# Patient Record
Sex: FEMALE | Race: WHITE | NOT HISPANIC OR LATINO | Employment: PART TIME | URBAN - METROPOLITAN AREA
[De-identification: names, ages, dates, MRNs, and addresses within clinical notes are randomized per-mention and may not be internally consistent; named-entity substitution may affect disease eponyms.]

---

## 2019-12-16 ENCOUNTER — OFFICE VISIT (OUTPATIENT)
Dept: FAMILY MEDICINE | Facility: CLINIC | Age: 24
End: 2019-12-16
Payer: COMMERCIAL

## 2019-12-16 ENCOUNTER — TELEPHONE (OUTPATIENT)
Dept: FAMILY MEDICINE | Facility: CLINIC | Age: 24
End: 2019-12-16

## 2019-12-16 VITALS
TEMPERATURE: 98.4 F | HEIGHT: 69 IN | WEIGHT: 172 LBS | HEART RATE: 63 BPM | SYSTOLIC BLOOD PRESSURE: 105 MMHG | DIASTOLIC BLOOD PRESSURE: 66 MMHG | BODY MASS INDEX: 25.48 KG/M2

## 2019-12-16 DIAGNOSIS — Z11.4 SCREENING FOR HIV (HUMAN IMMUNODEFICIENCY VIRUS): ICD-10-CM

## 2019-12-16 DIAGNOSIS — J45.20 MILD INTERMITTENT ASTHMA, UNSPECIFIED WHETHER COMPLICATED: ICD-10-CM

## 2019-12-16 DIAGNOSIS — L30.9 ECZEMA, UNSPECIFIED TYPE: Primary | ICD-10-CM

## 2019-12-16 DIAGNOSIS — L67.8 ABNORMAL FACIAL HAIR: ICD-10-CM

## 2019-12-16 DIAGNOSIS — L71.0 PERIORAL DERMATITIS: ICD-10-CM

## 2019-12-16 DIAGNOSIS — L21.9 SEBORRHEIC DERMATITIS: ICD-10-CM

## 2019-12-16 LAB — TSH SERPL DL<=0.005 MIU/L-ACNC: 1.47 MU/L (ref 0.4–4)

## 2019-12-16 PROCEDURE — 36415 COLL VENOUS BLD VENIPUNCTURE: CPT | Performed by: PHYSICIAN ASSISTANT

## 2019-12-16 PROCEDURE — 84443 ASSAY THYROID STIM HORMONE: CPT | Performed by: PHYSICIAN ASSISTANT

## 2019-12-16 PROCEDURE — 99203 OFFICE O/P NEW LOW 30 MIN: CPT | Performed by: PHYSICIAN ASSISTANT

## 2019-12-16 PROCEDURE — 84270 ASSAY OF SEX HORMONE GLOBUL: CPT | Performed by: PHYSICIAN ASSISTANT

## 2019-12-16 PROCEDURE — 84403 ASSAY OF TOTAL TESTOSTERONE: CPT | Performed by: PHYSICIAN ASSISTANT

## 2019-12-16 PROCEDURE — 87389 HIV-1 AG W/HIV-1&-2 AB AG IA: CPT | Performed by: PHYSICIAN ASSISTANT

## 2019-12-16 RX ORDER — BETAMETHASONE DIPROPIONATE 0.5 MG/G
CREAM TOPICAL 2 TIMES DAILY
COMMUNITY
End: 2020-05-06

## 2019-12-16 RX ORDER — ALBUTEROL SULFATE 90 UG/1
2 AEROSOL, METERED RESPIRATORY (INHALATION) EVERY 6 HOURS
COMMUNITY
End: 2021-12-10

## 2019-12-16 RX ORDER — TRIAMCINOLONE ACETONIDE 1 MG/G
CREAM TOPICAL 2 TIMES DAILY
COMMUNITY
End: 2020-05-06

## 2019-12-16 RX ORDER — KETOCONAZOLE 20 MG/ML
SHAMPOO TOPICAL DAILY PRN
Qty: 120 ML | Refills: 1 | Status: SHIPPED | OUTPATIENT
Start: 2019-12-16 | End: 2020-05-06

## 2019-12-16 RX ORDER — PIMECROLIMUS 10 MG/G
CREAM TOPICAL 2 TIMES DAILY
Qty: 100 G | Refills: 1 | Status: SHIPPED | OUTPATIENT
Start: 2019-12-16 | End: 2020-05-06

## 2019-12-16 RX ORDER — ALBUTEROL SULFATE 1.25 MG/3ML
1.25 SOLUTION RESPIRATORY (INHALATION) EVERY 6 HOURS PRN
COMMUNITY
End: 2021-12-10

## 2019-12-16 RX ORDER — KETOCONAZOLE 20 MG/ML
SHAMPOO TOPICAL DAILY PRN
COMMUNITY
End: 2019-12-16

## 2019-12-16 ASSESSMENT — MIFFLIN-ST. JEOR: SCORE: 1604.57

## 2019-12-16 NOTE — LETTER
[facility]   [address] [place]  [address]  [phone]                                   December 18, 2019    Rand Alvarado  [id number]   [place] [address]        Dear Rand,    labs are normal.  No cause seen for her abnormal hair growth.    Results for orders placed or performed in visit on 12/16/19   HIV Screening     Status: None   Result Value Ref Range    HIV Antigen Antibody Combo Nonreactive NR^Nonreactive       TSH with free T4 reflex     Status: None   Result Value Ref Range    TSH 1.47 0.40 - 4.00 mU/L   **Testosterone Free and Total FUTURE anytime     Status: None   Result Value Ref Range    Testosterone Total 17 8 - 60 ng/dL    Sex Hormone Binding Globulin 39 30 - 135 nmol/L    Free Testosterone Calculated 0.27 0.08 - 0.74 ng/dL       If you have any questions please call the clinic at [phone]    Sincerely,    Caridad Gray PA-C  [facility]

## 2019-12-16 NOTE — TELEPHONE ENCOUNTER
Panel Management Review      Patient has the following on her problem list:     Asthma review     ACT Total Scores 12/16/2019   ACT TOTAL SCORE (Goal Greater than or Equal to 20) 25   In the past 12 months, how many times did you visit the emergency room for your asthma without being admitted to the hospital? 0   In the past 12 months, how many times were you hospitalized overnight because of your asthma? 0      1. Is Asthma diagnosis on the Problem List? Yes    2. Is Asthma listed on Health Maintenance? Yes    3. Patient is due for:  ACT and AAP      Composite cancer screening  Chart review shows that this patient is due/due soon for the following None  Summary:    Patient is due/failing the following:   ACT    Action needed:   Patient needs to do ACT.    Type of outreach:    act done     Questions for provider review:    None                                                                                                                                         Chart routed to Care Team .

## 2019-12-16 NOTE — PROGRESS NOTES
"Subjective     Rand Alvarado is a 24 year old female who presents to clinic today for the following health issues:    HPI   New Patient/Transfer of Care  Medication Followup of  Eczema meds and refill     Taking Medication as prescribed: yes    Side Effects:  None    Medication Helping Symptoms:  yes     Eczema flares in the winter.  Worse this year.  Has on arms, neck and face and back of knees.  Uses shampoo on face.  worse on eyelids and around lips.  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    IUD-poss side effects discussion -weight gain,facial hair.IUD was placed 7-1-2019.  Had one before with no concerns.  Her grandmother has hx of thyroid problems but she has been checked before.  Last few months started getting facial hair and chest and neck and gained weight and now can't loose it.  More constipation.  More emotional.      Last pap done at [facility] last year Dec 2018-normal           Patient Active Problem List   Diagnosis     Mild intermittent asthma, unspecified whether complicated     Seborrheic dermatitis     Eczema, unspecified type     Past Surgical History:   Procedure Laterality Date     HC TOOTH EXTRACTION W/FORCEP  2015     KNEE SURGERY Bilateral 2010     KNEE SURGERY Bilateral 2012       Social History     Tobacco Use     Smoking status: Never Smoker     Smokeless tobacco: Never Used   Substance Use Topics     Alcohol use: Yes     Comment: socially      Family History   Problem Relation Age of Onset     Lupus Sister              Reviewed and updated as needed this visit by Provider  Tobacco  Allergies  Meds  Problems  Med Hx  Surg Hx  Fam Hx         Review of Systems   As above      Objective    /66   Pulse 63   Temp 98.4  F (36.9  C) (Oral)   Ht 1.753 m (5' 9\")   Wt 78 kg (172 lb)   BMI 25.40 kg/m    Body mass index is 25.4 kg/m .  Physical Exam  Constitutional:       General: She is not in acute " "distress.  Cardiovascular:      Rate and Rhythm: Normal rate and regular rhythm.   Pulmonary:      Effort: Pulmonary effort is normal.      Breath sounds: Normal breath sounds.   Skin:     Comments: Red thick skin on eye lids and cracked skin in corners of mouth, red irritated appearing skin in antecubital area, red, cracked skin on hands   Neurological:      Mental Status: She is alert.            Diagnostic Test Results:  none         Assessment & Plan     1. Eczema, unspecified type  If not improving with using new cream and using lotion with gloves at night see derm  - DERMATOLOGY REFERRAL    2. Seborrheic dermatitis  As above  - ketoconazole (NIZORAL) 2 % external shampoo; Apply topically daily as needed for itching or irritation Apply topically daily as needed for itching or irritation  Dispense: 120 mL; Refill: 1  - DERMATOLOGY REFERRAL    3. Mild intermittent asthma, unspecified whether complicated  Stable.  No changes    4. Abnormal facial hair  Follow up when labs are back  - TSH with free T4 reflex  - **Testosterone Free and Total FUTURE anytime    5. Screening for HIV (human immunodeficiency virus)    - HIV Screening    6. Perioral dermatitis  As above  - pimecrolimus (ELIDEL) 1 % external cream; Apply topically 2 times daily Apply thin layer to eye lids and around lips and on hands  Dispense: 100 g; Refill: 1  - DERMATOLOGY REFERRAL     BMI:   Estimated body mass index is 25.4 kg/m  as calculated from the following:    Height as of this encounter: 1.753 m (5' 9\").    Weight as of this encounter: 78 kg (172 lb).               Return in about 3 months (around 3/16/2020) for if not improving.    Caridad Gray PA-C  [facility]      "

## 2019-12-17 LAB — HIV 1+2 AB+HIV1 P24 AG SERPL QL IA: NONREACTIVE

## 2019-12-17 ASSESSMENT — ASTHMA QUESTIONNAIRES: ACT_TOTALSCORE: 25

## 2019-12-18 LAB
SHBG SERPL-SCNC: 39 NMOL/L (ref 30–135)
TESTOST FREE SERPL-MCNC: 0.27 NG/DL (ref 0.08–0.74)
TESTOST SERPL-MCNC: 17 NG/DL (ref 8–60)

## 2020-02-18 ENCOUNTER — TELEPHONE (OUTPATIENT)
Dept: FAMILY MEDICINE | Facility: CLINIC | Age: 25
End: 2020-02-18

## 2020-02-18 DIAGNOSIS — L30.9 ECZEMA, UNSPECIFIED TYPE: Primary | ICD-10-CM

## 2020-02-18 NOTE — TELEPHONE ENCOUNTER
Referral is in but I would suggest she check with her insurance first to see if they will cover the office visit    Caridad Gray PA-C

## 2020-02-18 NOTE — TELEPHONE ENCOUNTER
Reason for Call:  Other referral for nutritionist     Detailed comments: patient would like to see a nutritionist for her eczema   Please call when this has been done so she can make this appointment     Phone Number Patient can be reached at: Home number on file [phone] (home)    Best Time: any    Can we leave a detailed message on this number? YES    Call taken on 2/18/2020 at 4:16 PM by Molly Gill

## 2020-02-19 NOTE — TELEPHONE ENCOUNTER
Left message with information regarding referral  [facility]  Team 3 Coordinator

## 2020-05-01 ENCOUNTER — VIRTUAL VISIT (OUTPATIENT)
Dept: ONCOLOGY | Facility: CLINIC | Age: 25
End: 2020-05-01
Attending: PHYSICIAN ASSISTANT
Payer: COMMERCIAL

## 2020-05-01 DIAGNOSIS — L30.9 ECZEMA, UNSPECIFIED TYPE: Primary | ICD-10-CM

## 2020-05-01 PROCEDURE — 97802 MEDICAL NUTRITION INDIV IN: CPT | Mod: TEL,ZF | Performed by: DIETITIAN, REGISTERED

## 2020-05-01 NOTE — PROGRESS NOTES
"Rand Alvarado is a 25 year old female who is being evaluated via a billable telephone visit.      The patient has been notified of following:     \"This telephone visit will be conducted via a call between you and your physician/provider. We have found that certain health care needs can be provided without the need for a physical exam.  This service lets us provide the care you need with a short phone conversation.  If a prescription is necessary we can send it directly to your pharmacy.  If lab work is needed we can place an order for that and you can then stop by our lab to have the test done at a later time.    Telephone visits are billed at different rates depending on your insurance coverage. During this emergency period, for some insurers they may be billed the same as an in-person visit.  Please reach out to your insurance provider with any questions.    If during the course of the call the physician/provider feels a telephone visit is not appropriate, you will not be charged for this service.\"    Patient has given verbal consent for Telephone visit?  Yes    CLINICAL NUTRITION SERVICES - ASSESSMENT NOTE    Rand Alvarado 25 year old referred for MNT related to eczema    Time Spent: 45 minutes  Visit Type: phone  Referring Physician: NADEGE Catalan 2/18    NUTRITION HISTORY  Current diet: gluten free/dairy free      Rand tells me that since her visit with her PCP and dermatologist, she has tried the Whole30 diet.  With this, she noticed very good improvement in her skin.    She tells me that this diet was challenging for her but she really liked the results, thus, would like to follow a similar, more flexible version.    She inquires about what foods to avoid and consume to further help improve her excema.        ANTHROPOMETRICS  Height: 69\"  Weight: 172 lbs/78kg  BMI: 25  Weight Status:  Overweight BMI 25-29.9  IBW: 145 lbs  % IBW: 118%  Weight History: no changes per pt report  Wt " Readings from Last 6 Encounters:   12/16/19 78 kg (172 lb)     Medications/vitamins/minerals/herbals:   Reviewed    Labs:  Labs reviewed    Physical Findings:  NA    ASSESSED NUTRITION NEEDS:  Estimated Energy Needs: 2000 kcals (25 Kcal/Kg)  Justification: maintenance  Estimated Protein Needs: 80 grams protein (1 g pro/Kg)  Justification: maintenance  Estimated Fluid Needs: 2000  mL   Justification: maintenance    NUTRITION DIAGNOSIS:  Food and nutrition-related knowledge deficit related to allergens/excema as evidenced by pt with questions regarding foods to avoid and foods to consume with excema      INTERVENTIONS  Provided written & verbal education:   Reviewed foods to limit/avoid - most dairy, gluten, soy, tree nuts and processed foods.  In addition, studies show it might be helpful to avoid acidic and spicy foods.     Reviewed food to consume - non-starch vegetables, fruits, fish (omega 3), whole grains, probiotics (including yogurt/kefir), adequate hydration, vitamin D.       Pt verbalize understanding of materials provided during consult.   Patient Understanding: Excellent  Expected Compliance: Excellent     Goals  1.  Gluten free/dairy free diet (yogurt is okay)  2.  Limit processed foods     Follow-Up Plans: Pt has RD contact information for questions.      Viridiana Merritt RDN, LDN

## 2020-05-06 ENCOUNTER — VIRTUAL VISIT (OUTPATIENT)
Dept: FAMILY MEDICINE | Facility: CLINIC | Age: 25
End: 2020-05-06
Payer: COMMERCIAL

## 2020-05-06 DIAGNOSIS — J02.9 ACUTE PHARYNGITIS, UNSPECIFIED ETIOLOGY: Primary | ICD-10-CM

## 2020-05-06 DIAGNOSIS — L21.9 SEBORRHEIC DERMATITIS: ICD-10-CM

## 2020-05-06 PROCEDURE — 99213 OFFICE O/P EST LOW 20 MIN: CPT | Mod: 95 | Performed by: FAMILY MEDICINE

## 2020-05-06 RX ORDER — AMOXICILLIN 500 MG/1
500 CAPSULE ORAL 3 TIMES DAILY
Qty: 30 CAPSULE | Refills: 0 | Status: SHIPPED | OUTPATIENT
Start: 2020-05-06 | End: 2020-11-16

## 2020-05-06 RX ORDER — KETOCONAZOLE 20 MG/ML
SHAMPOO TOPICAL DAILY PRN
Qty: 240 ML | Refills: 3 | Status: SHIPPED | OUTPATIENT
Start: 2020-05-06 | End: 2021-12-22

## 2020-05-06 NOTE — LETTER
[address] [id number] [phone]              May 6, 2020    To whom it may concern     Rand Alvarado  89 was evaluated today.  She needs to be off work through this Friday May 8.  Plan return to work Saturday May 9.              Sincerely,                      Justus Dialy MD

## 2020-05-06 NOTE — PROGRESS NOTES
"=Rand Alvarado is a 25 year old female who is being evaluated via a billable telephone visit.      The patient has been notified of following:     \"This telephone visit will be conducted via a call between you and your physician/provider. We have found that certain health care needs can be provided without the need for a physical exam.  This service lets us provide the care you need with a short phone conversation.  If a prescription is necessary we can send it directly to your pharmacy.  If lab work is needed we can place an order for that and you can then stop by our lab to have the test done at a later time.    Telephone visits are billed at different rates depending on your insurance coverage. During this emergency period, for some insurers they may be billed the same as an in-person visit.  Please reach out to your insurance provider with any questions.    If during the course of the call the physician/provider feels a telephone visit is not appropriate, you will not be charged for this service.\"    Patient has given verbal consent for Telephone visit?  Yes    What phone number would you like to be contacted at? [phone]    How would you like to obtain your AVS? Mail a copy    Subjective     Rand Alvarado is a 25 year old female who presents to clinic today for the following health issues:    HPI  Sore throat and lymph nodes in the neck are really tender, headache for the past 1.5 weeks       Getting worse     Hurts to swallow    Around sick people    A few weeks ago strep exposure    Patient prone to strep    No fever    No shortness of breath    No coughing much         Reviewed and updated as needed this visit by Provider         Review of Systems   Asthma only acts up with exercise  Or bad resp symptoms            Objective   Reported vitals:  There were no vitals taken for this visit.   healthy, alert and no distress  PSYCH: Alert and oriented times 3; coherent speech, normal   rate " and volume, able to articulate logical thoughts, able   to abstract reason, no tangential thoughts, no hallucinations   or delusions  Her affect is normal  RESP: No cough, no audible wheezing, able to talk in full sentences  Remainder of exam unable to be completed due to telephone visits    Diagnostic Test Results:  Labs reviewed in Epic        Assessment/Plan:  1. Seborrheic dermatitis  Patient needs refill and would like a bigger quantity; sent in refills for her  - ketoconazole (NIZORAL) 2 % external shampoo; Apply topically daily as needed for itching or irritation Apply topically daily as needed for itching or irritation  Dispense: 240 mL; Refill: 3    2. Acute pharyngitis, unspecified etiology  Given duration and worsening nature of symptoms, will treat.  Follow up prn symptoms.   - amoxicillin (AMOXIL) 500 MG capsule; Take 1 capsule (500 mg) by mouth 3 times daily  Dispense: 30 capsule; Refill: 0    No follow-ups on file.    Also did letter for patient.  She will  at .    Phone call duration:12 minutes ( ( 3:35 to 3:47 pm )      Justus Daily MD

## 2020-11-16 ENCOUNTER — OFFICE VISIT (OUTPATIENT)
Dept: FAMILY MEDICINE | Facility: CLINIC | Age: 25
End: 2020-11-16
Payer: COMMERCIAL

## 2020-11-16 VITALS
BODY MASS INDEX: 23.19 KG/M2 | TEMPERATURE: 97.4 F | OXYGEN SATURATION: 98 % | WEIGHT: 153 LBS | SYSTOLIC BLOOD PRESSURE: 112 MMHG | HEART RATE: 67 BPM | DIASTOLIC BLOOD PRESSURE: 71 MMHG | HEIGHT: 68 IN

## 2020-11-16 DIAGNOSIS — S06.0X0A CONCUSSION WITHOUT LOSS OF CONSCIOUSNESS, INITIAL ENCOUNTER: Primary | ICD-10-CM

## 2020-11-16 DIAGNOSIS — R11.0 NAUSEA: ICD-10-CM

## 2020-11-16 PROCEDURE — 99214 OFFICE O/P EST MOD 30 MIN: CPT | Performed by: FAMILY MEDICINE

## 2020-11-16 RX ORDER — CLOBETASOL PROPIONATE 0.5 MG/G
OINTMENT TOPICAL 2 TIMES DAILY
COMMUNITY
End: 2021-12-10

## 2020-11-16 RX ORDER — ONDANSETRON 4 MG/1
4 TABLET, FILM COATED ORAL EVERY 8 HOURS PRN
Qty: 20 TABLET | Refills: 0 | Status: SHIPPED | OUTPATIENT
Start: 2020-11-16 | End: 2021-12-10

## 2020-11-16 RX ORDER — TRIAMCINOLONE ACETONIDE 1 MG/G
OINTMENT TOPICAL
COMMUNITY
Start: 2020-07-30 | End: 2021-12-10

## 2020-11-16 ASSESSMENT — MIFFLIN-ST. JEOR: SCORE: 1499.25

## 2020-11-16 ASSESSMENT — ASTHMA QUESTIONNAIRES
ACT_TOTALSCORE: 25
QUESTION_4 LAST FOUR WEEKS HOW OFTEN HAVE YOU USED YOUR RESCUE INHALER OR NEBULIZER MEDICATION (SUCH AS ALBUTEROL): NOT AT ALL
QUESTION_5 LAST FOUR WEEKS HOW WOULD YOU RATE YOUR ASTHMA CONTROL: COMPLETELY CONTROLLED
QUESTION_1 LAST FOUR WEEKS HOW MUCH OF THE TIME DID YOUR ASTHMA KEEP YOU FROM GETTING AS MUCH DONE AT WORK, SCHOOL OR AT HOME: NONE OF THE TIME
QUESTION_3 LAST FOUR WEEKS HOW OFTEN DID YOUR ASTHMA SYMPTOMS (WHEEZING, COUGHING, SHORTNESS OF BREATH, CHEST TIGHTNESS OR PAIN) WAKE YOU UP AT NIGHT OR EARLIER THAN USUAL IN THE MORNING: NOT AT ALL
QUESTION_2 LAST FOUR WEEKS HOW OFTEN HAVE YOU HAD SHORTNESS OF BREATH: NOT AT ALL

## 2020-11-16 ASSESSMENT — PAIN SCALES - GENERAL: PAINLEVEL: MODERATE PAIN (4)

## 2020-11-16 NOTE — PATIENT INSTRUCTIONS
Patient Education     After a Concussion     Awaken to check alertness as often as the health care provider suggests.     If you had a mild concussion (a head injury), watch closely for signs of problems during the first 48 hours after the injury. Follow the doctor s advice about recovering at home. Use the tips on this handout as a guide.  Note: You should not be left alone after a concussion. If no adult can stay with the injured person, let the doctor know.  Have someone call 911 or your emergency number if you can't fully wake up or have a seizures or convulsions.  The first 48 hours  Don t take medicine unless approved by your healthcare provider. Try placing a cold, damp cloth on your head to help relieve a headache.    Ask the doctor before using any medicines.    Don't drink alcohol or take sedatives or medicines that make you sleepy.    Don't return to sports or any activity that could cause you to hit your head until all symptoms are gone and you have been cleared by your doctor. A second head injury before fully recovering from the first one can lead to serious brain injury.    Don't do activities that need a lot of concentration or a lot of attention. This will allow your brain to rest and heal more quickly.    Return to regular physical and mental activity as directed and approved by your healthcare provider.  Tips about sleeping  For the first day or two, it may be best not to sleep for long periods of time without being checked for alertness. Follow the doctor s instructions.  ? Have someone wake you every ____ hours for the next ____ hours. He or she should ask you questions to check for alertness.  ? OK to sleep through the night.  When to call the healthcare provider  If you notice any of the following, call the healthcare provider:    Vomiting. Some vomiting is common, but tell the provider about any vomiting.    Clear or bloody drainage from the nose or ear    Constant drowsiness or difficulty  in waking up    Confusion or memory loss    Blurred vision or any vision changes    Inability to walk or talk normally    Increased weakness or problems with coordination    Constant, unrelieved headache that becomes more severe    Changes in behavior or personality    High-pitched crying in infants    Signs of stroke such as paralysis of parts of the body    Uncrontrolled movements suggesting a seizure  [employer] last reviewed this educational content on 12/1/2017 2000-2020 [employer]. [address] [place] [address], [place] [id number]. All rights reserved. This information is not intended as a substitute for professional medical care. Always follow your healthcare professional's instructions.           Patient Education     Depression and Traumatic Brain Injury  Traumatic brain injury (TBI) is an injury to your brain that can change the way you think, act, and feel. It's easy to understand how a brain injury can change your thinking. It may be harder to understand how it changes your feelings. In fact, dealing with changes in feelings and emotions may be the hardest part of a TBI.   A TBI is caused by a jolt or a blow to the brain. A TBI can be caused by a fall, car accident, fight, or sports injury. One of the changes that can happen after a TBI is depression. Studies show that depression affects anywhere from 3 in 20 people to more than 1 in 2 people with a TBI.     A TBI may change your brain in a way that increases your risk for depression. The stress of recovering from a TBI can also increase your depression risk. It's important to recognize and treat depression because it can slow your TBI recovery. The combination of a TBI and depression is also dangerous. It may increase your risk for substance abuse and even suicide.   Symptoms of depression after a TBI   Many of the symptoms of depression and TBI are similar. Having a TBI can get you down. It's normal to have  the blues  sometimes. But depression  symptoms tend to be worse and last longer than the blues. Let your healthcare provider know if you have symptoms of depression, such as:     Changes in sleep    Changes in your appetite    Trouble concentrating or paying attention    Lack of energy    Lack of interest in things and activities you usually enjoy, including sex    Feeling very guilty, sad, worthless, or hopeless    Thinking about death or suicide   Treating depression after a TBI  If you have a TBI and depression, you should be treated for depression in addition to the steps you re taking to recover from the TBI. Know that depression is a medical problem, not a sign of weakness. You can t just snap out of it using willpower. Untreated depression can lead to problems at work and at home. The good news is that you are not alone and that there is treatment for depression that works. Here are some types of effective treatment:     Cognitive behavioral therapy (CBT). This is a type of counseling, or talk therapy, given by a mental health professional. CBT teaches you to recognize negative thoughts and behaviors. You will learn how to cope with these thoughts and behaviors and how to change them.    Interpersonal therapy (IPT). This is another type of counseling that helps with depression. In IPT, a mental health professional helps you identify relationship problems that contribute to depression. You will learn to improve your communication and problem-solving skills.    Problem-solving therapy (PST). This is a way to treat depression by learning a step-by-step approach to solving problems.    Antidepressant medicines. These medicines correct the chemical imbalance in the brain that causes depression. Medicines take a few weeks to start working. They are often combined with counseling for the best results.    Electroconvulsive therapy (ECT). This uses electrical activity to treat symptoms of severe depression which don't respond to other means.  Symptoms of  depression and a TBI can be very similar. Let your healthcare provider know about any TBI symptoms that are getting worse and about any new symptoms. If you have feelings of sadness, hopelessness, or grief that are interfering with your life and your TBI recovery, it could be depression.   Don t try to treat your symptoms with alcohol or drugs. These substances make both depression and the TBI worse. Always let someone know right away if you have any thoughts of suicide. Call 911. Thoughts of suicide are a medical emergency.   [employer] last reviewed this educational content on 10/1/2019    4948-3769 [employer]. [address] [place] [address]. All rights reserved. This information is not intended as a substitute for professional medical care. Always follow your healthcare professional's instructions.

## 2020-11-16 NOTE — PROGRESS NOTES
Subjective     Rand Alvarado is a 25 year old female who presents to clinic today for the following health issues:    HPI   Patient reports yesterday she had the top of her head on the ceiling as she was going downstairs.  She has no loss of consciousness.  Since that time she is feeling more lightheaded, dizzy, nausea, stomach upset.    She has no bleeding, no neck pain or stiffness.   Today she went to work and she feels nauseated, lightheaded.    She reports bright lights, noises, and mental activity because her headache was.    Headache  Onset/Duration: yesterday  Description  Location: bilateral in the frontal area   Character: throbbing pain, sharp pain  Frequency:  Almost 20 hours   Duration:  Since yesterday  Wake with headaches: no  Able to do daily activities when headache present: YES  Intensity:  severe, 4/10  Progression of Symptoms:  worsening and constant  Accompanying signs and symptoms:  Stiff neck: YES  Neck or upper back pain: no  Sinus or URI symptoms no   Fever: no  Nausea or vomiting: YES- nausea  Dizziness: YES  Numbness/tingling: no  Weakness: no  Visual changes: flashing lights and cfeels like eye delay   History  Head trauma: YES  Family history of migraines: no  Daily pain medication use: no  Previous tests for headaches: no  Neurologist evaluation: no  Precipitating or Alleviating factors (light/sound/sleep/caffeine): None  Therapies tried and outcome: Ice              Outcome - effective  Frequent/daily pain medication use: no    Review of Systems   Constitutional, HEENT, cardiovascular, pulmonary, GI, , musculoskeletal, neuro, skin, endocrine and psych systems are negative, except as otherwise noted.      Objective    There were no vitals taken for this visit.  There is no height or weight on file to calculate BMI.  Physical Exam   GENERAL: healthy, alert and no distress  EYES: Eyes grossly normal to inspection, PERRL and conjunctivae and sclerae normal  HENT: ear canals and  TM's normal, nose and mouth without ulcers or lesions  NECK: no adenopathy, no asymmetry, masses, or scars and thyroid normal to palpation  MS: no gross musculoskeletal defects noted, no edema  NEURO: Normal strength and tone, mentation intact and speech normal  NEURO: Normal strength and tone, sensory exam grossly normal, mentation intact, dysarthria and DTR's normal and symmetric   PSYCH: mentation appears normal, affect normal/bright          Assessment & Plan    Diagnosis Comments   1. Concussion without loss of consciousness, initial encounter  ondansetron (ZOFRAN) 4 MG tablet    2. Nausea  ondansetron (ZOFRAN) 4 MG tablet      Mental exam is normal.  Patient likely had a minor concussion.  Advised with home resting, avoid electronic, avoid any mental activities.  She may take Tylenol for the headache, as needed.  Given a prescription for Zofran.    Discussed with the patient she may need to be off work until 11/20, pending her symptoms.    If she continued to have symptoms.  She may need to take more time off.    Patient Instructions       Patient Education     After a Concussion     Awaken to check alertness as often as the health care provider suggests.     If you had a mild concussion (a head injury), watch closely for signs of problems during the first 48 hours after the injury. Follow the doctor s advice about recovering at home. Use the tips on this handout as a guide.  Note: You should not be left alone after a concussion. If no adult can stay with the injured person, let the doctor know.  Have someone call 911 or your emergency number if you can't fully wake up or have a seizures or convulsions.  The first 48 hours  Don t take medicine unless approved by your healthcare provider. Try placing a cold, damp cloth on your head to help relieve a headache.    Ask the doctor before using any medicines.    Don't drink alcohol or take sedatives or medicines that make you sleepy.    Don't return to sports or any  activity that could cause you to hit your head until all symptoms are gone and you have been cleared by your doctor. A second head injury before fully recovering from the first one can lead to serious brain injury.    Don't do activities that need a lot of concentration or a lot of attention. This will allow your brain to rest and heal more quickly.    Return to regular physical and mental activity as directed and approved by your healthcare provider.  Tips about sleeping  For the first day or two, it may be best not to sleep for long periods of time without being checked for alertness. Follow the doctor s instructions.  ? Have someone wake you every ____ hours for the next ____ hours. He or she should ask you questions to check for alertness.  ? OK to sleep through the night.  When to call the healthcare provider  If you notice any of the following, call the healthcare provider:    Vomiting. Some vomiting is common, but tell the provider about any vomiting.    Clear or bloody drainage from the nose or ear    Constant drowsiness or difficulty in waking up    Confusion or memory loss    Blurred vision or any vision changes    Inability to walk or talk normally    Increased weakness or problems with coordination    Constant, unrelieved headache that becomes more severe    Changes in behavior or personality    High-pitched crying in infants    Signs of stroke such as paralysis of parts of the body    Uncrontrolled movements suggesting a seizure  [employer] last reviewed this educational content on 12/1/2017 2000-2020 [employer]. [address] [place] [address]. All rights reserved. This information is not intended as a substitute for professional medical care. Always follow your healthcare professional's instructions.           Patient Education     Depression and Traumatic Brain Injury  Traumatic brain injury (TBI) is an injury to your brain that can change the way you think, act, and feel.  It's easy to understand how a brain injury can change your thinking. It may be harder to understand how it changes your feelings. In fact, dealing with changes in feelings and emotions may be the hardest part of a TBI.   A TBI is caused by a jolt or a blow to the brain. A TBI can be caused by a fall, car accident, fight, or sports injury. One of the changes that can happen after a TBI is depression. Studies show that depression affects anywhere from 3 in 20 people to more than 1 in 2 people with a TBI.     A TBI may change your brain in a way that increases your risk for depression. The stress of recovering from a TBI can also increase your depression risk. It's important to recognize and treat depression because it can slow your TBI recovery. The combination of a TBI and depression is also dangerous. It may increase your risk for substance abuse and even suicide.   Symptoms of depression after a TBI   Many of the symptoms of depression and TBI are similar. Having a TBI can get you down. It's normal to have  the blues  sometimes. But depression symptoms tend to be worse and last longer than the blues. Let your healthcare provider know if you have symptoms of depression, such as:     Changes in sleep    Changes in your appetite    Trouble concentrating or paying attention    Lack of energy    Lack of interest in things and activities you usually enjoy, including sex    Feeling very guilty, sad, worthless, or hopeless    Thinking about death or suicide   Treating depression after a TBI  If you have a TBI and depression, you should be treated for depression in addition to the steps you re taking to recover from the TBI. Know that depression is a medical problem, not a sign of weakness. You can t just snap out of it using willpower. Untreated depression can lead to problems at work and at home. The good news is that you are not alone and that there is treatment for depression that works. Here are some types of effective  treatment:     Cognitive behavioral therapy (CBT). This is a type of counseling, or talk therapy, given by a mental health professional. CBT teaches you to recognize negative thoughts and behaviors. You will learn how to cope with these thoughts and behaviors and how to change them.    Interpersonal therapy (IPT). This is another type of counseling that helps with depression. In IPT, a mental health professional helps you identify relationship problems that contribute to depression. You will learn to improve your communication and problem-solving skills.    Problem-solving therapy (PST). This is a way to treat depression by learning a step-by-step approach to solving problems.    Antidepressant medicines. These medicines correct the chemical imbalance in the brain that causes depression. Medicines take a few weeks to start working. They are often combined with counseling for the best results.    Electroconvulsive therapy (ECT). This uses electrical activity to treat symptoms of severe depression which don't respond to other means.  Symptoms of depression and a TBI can be very similar. Let your healthcare provider know about any TBI symptoms that are getting worse and about any new symptoms. If you have feelings of sadness, hopelessness, or grief that are interfering with your life and your TBI recovery, it could be depression.   Don t try to treat your symptoms with alcohol or drugs. These substances make both depression and the TBI worse. Always let someone know right away if you have any thoughts of suicide. Call 911. Thoughts of suicide are a medical emergency.   [employer] last reviewed this educational content on 10/1/2019    3007-2467 [employer]. [address] [place] [address], [place] [address]. All rights reserved. This information is not intended as a substitute for professional medical care. Always follow your healthcare professional's instructions.               No follow-ups on file.    Isaias HOUSTON  MD Shaun  [facility]

## 2020-11-16 NOTE — LETTER
November 16, 2020      Rand Alvarado  [id number] [address] [facility] [address]        To Whom It May Concern:    Rand Alvarado  was seen on 11/16/2020.  Please excuse her  until 11/20/2020 due to illness.  Return to work on 11/20/2020      Sincerely,        Isaias Dunn MD

## 2020-11-17 ASSESSMENT — ASTHMA QUESTIONNAIRES: ACT_TOTALSCORE: 25

## 2020-11-23 ENCOUNTER — TELEPHONE (OUTPATIENT)
Dept: FAMILY MEDICINE | Facility: CLINIC | Age: 25
End: 2020-11-23

## 2020-11-23 NOTE — LETTER
November 23, 2020      Rand Alvarado  [address] [facility]  [place] [address]        To Whom It May Concern:    Rand Alvarado   Return to work, on 11/24/2020,   Work only 4 hours shifts, until symptoms resolves.      Sincerely,        Isaias Dunn MD

## 2020-11-23 NOTE — TELEPHONE ENCOUNTER
Patient requesting to extend work note, she stated note needs to state  4hr shift until symptoms resolves and patient will  at front when ready. Please advise.

## 2020-12-01 ENCOUNTER — TELEPHONE (OUTPATIENT)
Dept: FAMILY MEDICINE | Facility: CLINIC | Age: 25
End: 2020-12-01

## 2020-12-01 NOTE — TELEPHONE ENCOUNTER
Forms received from: [facility]    Phone number listed: [phone]   Fax listed: [phone]  Date received: 12/01/2020  Form description: [facility]  Once forms are completed, please return to [facility] via fax.  Form placed: in providers folder  Katie Fajardo

## 2020-12-13 ENCOUNTER — HEALTH MAINTENANCE LETTER (OUTPATIENT)
Age: 25
End: 2020-12-13

## 2021-09-26 ENCOUNTER — HEALTH MAINTENANCE LETTER (OUTPATIENT)
Age: 26
End: 2021-09-26

## 2021-11-30 ASSESSMENT — ANXIETY QUESTIONNAIRES
6. BECOMING EASILY ANNOYED OR IRRITABLE: NOT AT ALL
GAD7 TOTAL SCORE: 0
1. FEELING NERVOUS, ANXIOUS, OR ON EDGE: NOT AT ALL
2. NOT BEING ABLE TO STOP OR CONTROL WORRYING: NOT AT ALL
3. WORRYING TOO MUCH ABOUT DIFFERENT THINGS: NOT AT ALL
7. FEELING AFRAID AS IF SOMETHING AWFUL MIGHT HAPPEN: NOT AT ALL
GAD7 TOTAL SCORE: 0
GAD7 TOTAL SCORE: 0
5. BEING SO RESTLESS THAT IT IS HARD TO SIT STILL: NOT AT ALL
4. TROUBLE RELAXING: NOT AT ALL
7. FEELING AFRAID AS IF SOMETHING AWFUL MIGHT HAPPEN: NOT AT ALL

## 2021-11-30 ASSESSMENT — ENCOUNTER SYMPTOMS
ABDOMINAL PAIN: 1
HEARTBURN: 1
HOT FLASHES: 1
NAUSEA: 1
BLOATING: 1

## 2021-12-01 ASSESSMENT — ANXIETY QUESTIONNAIRES: GAD7 TOTAL SCORE: 0

## 2021-12-10 ENCOUNTER — OFFICE VISIT (OUTPATIENT)
Dept: OBGYN | Facility: CLINIC | Age: 26
End: 2021-12-10
Attending: ADVANCED PRACTICE MIDWIFE
Payer: COMMERCIAL

## 2021-12-10 VITALS — HEIGHT: 69 IN | WEIGHT: 158.5 LBS | BODY MASS INDEX: 23.47 KG/M2

## 2021-12-10 DIAGNOSIS — T83.9XXA COMPLICATION OF INTRAUTERINE DEVICE (IUD), UNSPECIFIED COMPLICATION, INITIAL ENCOUNTER (H): ICD-10-CM

## 2021-12-10 DIAGNOSIS — Z00.00 ENCOUNTER FOR PREVENTIVE HEALTH EXAMINATION: ICD-10-CM

## 2021-12-10 DIAGNOSIS — Z11.3 SCREEN FOR STD (SEXUALLY TRANSMITTED DISEASE): Primary | ICD-10-CM

## 2021-12-10 DIAGNOSIS — N94.89 UTERINE CRAMPING: ICD-10-CM

## 2021-12-10 PROBLEM — R20.2 LEFT LEG PARESTHESIAS: Status: ACTIVE | Noted: 2017-03-02

## 2021-12-10 PROBLEM — M79.662 PAIN OF LEFT CALF: Status: ACTIVE | Noted: 2017-03-02

## 2021-12-10 LAB
CLUE CELLS: NEGATIVE
TRICHOMONAS (WET PREP): NEGATIVE
WBC (WET PREP): NORMAL
YEAST (WET PREP): NEGATIVE

## 2021-12-10 PROCEDURE — G0145 SCR C/V CYTO,THINLAYER,RESCR: HCPCS | Performed by: ADVANCED PRACTICE MIDWIFE

## 2021-12-10 PROCEDURE — 87389 HIV-1 AG W/HIV-1&-2 AB AG IA: CPT | Performed by: ADVANCED PRACTICE MIDWIFE

## 2021-12-10 PROCEDURE — 99213 OFFICE O/P EST LOW 20 MIN: CPT | Mod: 25 | Performed by: ADVANCED PRACTICE MIDWIFE

## 2021-12-10 PROCEDURE — G0463 HOSPITAL OUTPT CLINIC VISIT: HCPCS | Mod: 25

## 2021-12-10 PROCEDURE — 87340 HEPATITIS B SURFACE AG IA: CPT | Performed by: ADVANCED PRACTICE MIDWIFE

## 2021-12-10 PROCEDURE — 87591 N.GONORRHOEAE DNA AMP PROB: CPT | Performed by: ADVANCED PRACTICE MIDWIFE

## 2021-12-10 PROCEDURE — 86780 TREPONEMA PALLIDUM: CPT | Performed by: ADVANCED PRACTICE MIDWIFE

## 2021-12-10 PROCEDURE — 99385 PREV VISIT NEW AGE 18-39: CPT | Performed by: ADVANCED PRACTICE MIDWIFE

## 2021-12-10 PROCEDURE — 86803 HEPATITIS C AB TEST: CPT | Performed by: ADVANCED PRACTICE MIDWIFE

## 2021-12-10 PROCEDURE — 36415 COLL VENOUS BLD VENIPUNCTURE: CPT | Performed by: ADVANCED PRACTICE MIDWIFE

## 2021-12-10 PROCEDURE — 87491 CHLMYD TRACH DNA AMP PROBE: CPT | Performed by: ADVANCED PRACTICE MIDWIFE

## 2021-12-10 PROCEDURE — 87210 SMEAR WET MOUNT SALINE/INK: CPT | Performed by: ADVANCED PRACTICE MIDWIFE

## 2021-12-10 ASSESSMENT — ENCOUNTER SYMPTOMS
LIGHT-HEADEDNESS: 0
MEMORY LOSS: 0
BREAST PAIN: 0
EXERCISE INTOLERANCE: 0
WHEEZING: 0
MYALGIAS: 0
COUGH: 0
PARALYSIS: 0
EYE PAIN: 0
DYSPNEA ON EXERTION: 0
NIGHT SWEATS: 0
FLANK PAIN: 0
SINUS PAIN: 0
CHILLS: 0
BLOATING: 1
POLYPHAGIA: 0
HEMATURIA: 0
NERVOUS/ANXIOUS: 0
TREMORS: 0
TINGLING: 0
MUSCLE WEAKNESS: 0
HOARSE VOICE: 0
ARTHRALGIAS: 0
TACHYCARDIA: 0
POSTURAL DYSPNEA: 0
DOUBLE VISION: 0
DIZZINESS: 0
FEVER: 0
INCREASED ENERGY: 0
DYSURIA: 0
SHORTNESS OF BREATH: 0
DECREASED APPETITE: 0
PANIC: 0
BACK PAIN: 0
CLAUDICATION: 0
DISTURBANCES IN COORDINATION: 0
NAUSEA: 1
SORE THROAT: 0
SPUTUM PRODUCTION: 0
HALLUCINATIONS: 0
DIFFICULTY URINATING: 0
NECK PAIN: 0
EYE IRRITATION: 0
HYPOTENSION: 0
EYE WATERING: 0
ORTHOPNEA: 0
SWOLLEN GLANDS: 0
DECREASED CONCENTRATION: 0
NUMBNESS: 0
ABDOMINAL PAIN: 1
EYE REDNESS: 0
SMELL DISTURBANCE: 0
HEARTBURN: 1
BREAST MASS: 0
DEPRESSION: 0
INSOMNIA: 0
WEIGHT GAIN: 0
ALTERED TEMPERATURE REGULATION: 0
NECK MASS: 0
HYPERTENSION: 0
HEMOPTYSIS: 0
HEADACHES: 0
SYNCOPE: 0
WEAKNESS: 0
BRUISES/BLEEDS EASILY: 0
STIFFNESS: 0
WEIGHT LOSS: 0
COUGH DISTURBING SLEEP: 0
HOT FLASHES: 1
LOSS OF CONSCIOUSNESS: 0
SNORES LOUDLY: 0
RESPIRATORY PAIN: 0
POLYDIPSIA: 0
LEG PAIN: 0
SEIZURES: 0
MUSCLE CRAMPS: 0
PALPITATIONS: 0
SPEECH CHANGE: 0
SINUS CONGESTION: 0
TASTE DISTURBANCE: 0
JOINT SWELLING: 0
TROUBLE SWALLOWING: 0
LEG SWELLING: 0
EXTREMITY NUMBNESS: 0
FATIGUE: 0
SLEEP DISTURBANCES DUE TO BREATHING: 0

## 2021-12-10 ASSESSMENT — MIFFLIN-ST. JEOR: SCORE: 1533.58

## 2021-12-10 ASSESSMENT — ANXIETY QUESTIONNAIRES
5. BEING SO RESTLESS THAT IT IS HARD TO SIT STILL: NOT AT ALL
7. FEELING AFRAID AS IF SOMETHING AWFUL MIGHT HAPPEN: NOT AT ALL
2. NOT BEING ABLE TO STOP OR CONTROL WORRYING: NOT AT ALL
IF YOU CHECKED OFF ANY PROBLEMS ON THIS QUESTIONNAIRE, HOW DIFFICULT HAVE THESE PROBLEMS MADE IT FOR YOU TO DO YOUR WORK, TAKE CARE OF THINGS AT HOME, OR GET ALONG WITH OTHER PEOPLE: NOT DIFFICULT AT ALL
1. FEELING NERVOUS, ANXIOUS, OR ON EDGE: NOT AT ALL
GAD7 TOTAL SCORE: 0
6. BECOMING EASILY ANNOYED OR IRRITABLE: NOT AT ALL
3. WORRYING TOO MUCH ABOUT DIFFERENT THINGS: NOT AT ALL

## 2021-12-10 ASSESSMENT — PATIENT HEALTH QUESTIONNAIRE - PHQ9
SUM OF ALL RESPONSES TO PHQ QUESTIONS 1-9: 1
5. POOR APPETITE OR OVEREATING: NOT AT ALL

## 2021-12-10 ASSESSMENT — PAIN SCALES - GENERAL: PAINLEVEL: EXTREME PAIN (8)

## 2021-12-10 NOTE — NURSING NOTE
Annual exam  And iud check  mirena inserted 2019  having  Bleeding every day for last month and intense pain and cramping for month-    Before that  Had periods every month that lasted about a week.  Cramping and discharge were her symptoms-    Gets light headed when craps are severe.

## 2021-12-10 NOTE — PROGRESS NOTES
"  Progress Note    SUBJECTIVE:  Rand Alvarado is an 26 year old, G0 who requests an Annual Preventive Exam.     She is a new patient to the [facility].     Rand had a Mirena IUD placed in 2016, noticed bleeding and cramping and discovered it was malpositioned, had that IUD removed and a new IUD placed 2019.  Has had very minimal bleeding since having her second Mirena inserted, but then recently 11/16/21 she developed moderate flow until Dec 5th, now she is noticing more discharge than normal, it is thin and it has an odor. Denies itching and irritation. She has been using Summer's Prisca wash on the outside of her labia (no douching)   Developed \"extreme cramping\" starting in August. She has this cramping monthly, the pain is  8-9/10, feels like she will pass out, she takes 800mg of ibuprofen and she doesn't feel like it helps her. The pain is located in the center, low in her pelvis where she believes her uterus is.   She has not checked her IUD strings since she had it placed    Not currently sexually active, 3 partners in the last 12 months, all male. Accepts STD screening today    Age 24 developed abnormal hair growth chest, chin, neck, acne. She is wondering if she has PCOS. She does have a few aunts that also have hair growth on their face, chest, chin. Rand has seen a dermatologist in the past.   Rand also has two maternal aunts with endometriosis - she is wondering if the pain she is experiencing is related to endometriosis    The patient reports that there is not domestic violence in her life.     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    Last pap 2018, NIL, due for a pap today      Menstrual History:  Menstrual History 12/10/2021   LAST MENSTRUAL PERIOD 11/1/2021         Mammogram current: not applicable    Last Colonoscopy:  No results found " for this or any previous visit.      HISTORY:  ketoconazole (NIZORAL) 2 % external shampoo, Apply topically daily as needed for itching or irritation Apply topically daily as needed for itching or irritation  levonorgestrel (MIRENA) 20 MCG/24HR IUD, 1 each by Intrauterine route once    No current facility-administered medications on file prior to visit.    Allergies   Allergen Reactions     Drug Ingredient [Nickel] Blisters     No Clinical Screening - See Comments Itching and Shortness Of Breath     cats     Propylene Glycol Rash     Immunization History   Administered Date(s) Administered     COVID-19,PF,Pfizer (12+ Yrs) 2020, 2021     DTAP (<7y) 1997, 1997, 1997, 1998, 2002     DTaP, Unspecified 1997, 1997, 1997, 1998, 2002     [id number] Novel Flu 2009     HPV Quadrivalent 10/15/2009, 2009, 2010     Hep B, Peds or Adolescent 1997, 1997, 1998     HepA-ped 2 Dose 2012, 2013     HepB, Unspecified 1997, 1997, 1998     HepB-Adult 2019     Hib, Unspecified 1997, 1997, 1997, 2002     Historic Hib Hib-titer 1997, 1997, 1997, 1998     Influenza (H1N1) 2009     Influenza Vaccine IM > 6 months Valent IIV4 (Alfuria,Fluzone) 2015, 10/05/2016, 10/26/2017, 10/17/2018, 2019     MMR 1998, 1998, 2002, 2002     Mantoux Tuberculin Skin Test 2015     Meningococcal (Menactra ) 2009, 2014     Meningococcal (Menveo ) 2014     OPV, unspecified 1997, 1997, 1998, 2002     Polio, Unspecified  1997, 1997, 1998, 2002     TDAP Vaccine (Adacel) 2009     Td (Adult), Adsorbed 2018     Varicella Immunity: Titer/MD Dx 1997       OB History    Para Term  AB Living   0 0 0 0 0 0   SAB IAB Ectopic Multiple Live Births   0  0 0 0 0     Past Medical History:   Diagnosis Date     Chlamydia 2016     Uncomplicated asthma     only with illness, rare     Urinary tract infection     5/2021     Wounds and injuries 2020    PTSD, work related.      Past Surgical History:   Procedure Laterality Date     HC TOOTH EXTRACTION W/FORCEP  2015     KNEE SURGERY Bilateral 2010     KNEE SURGERY Bilateral 2012     ORTHOPEDIC SURGERY  2012&2014     Family History   Problem Relation Age of Onset     Anesthesia Reaction Mother      Lupus Sister      Breast Cancer Paternal Grandmother         60's     Diabetes Maternal Grandmother      Thyroid Disease Maternal Grandmother      Diabetes Maternal Grandfather      Anesthesia Reaction Sister      Endometriosis Maternal Aunt      Endometriosis Maternal Aunt      Endometriosis Paternal Aunt      Social History     Socioeconomic History     Marital status: Single     Spouse name: Not on file     Number of children: Not on file     Years of education: Not on file     Highest education level: Not on file   Occupational History     Not on file   Tobacco Use     Smoking status: Never Smoker     Smokeless tobacco: Never Used   Substance and Sexual Activity     Alcohol use: Yes     Comment: socially      Drug use: Never     Sexual activity: Yes     Partners: Male     Birth control/protection: I.U.D.   Other Topics Concern     Not on file   Social History Narrative     Not on file     Social Determinants of Health     Financial Resource Strain: Not on file   Food Insecurity: Not on file   Transportation Needs: Not on file   Physical Activity: Not on file   Stress: Not on file   Social Connections: Not on file   Intimate Partner Violence: Not on file   Housing Stability: Not on file       Review of Systems     Constitutional:  Negative for fever, chills, weight loss, weight gain, fatigue, decreased appetite, night sweats, recent stressors, height gain, height loss, post-operative complications, incisional pain,  hallucinations, increased energy, hyperactivity and confused.   HENT:  Negative for ear pain, hearing loss, tinnitus, nosebleeds, trouble swallowing, hoarse voice, mouth sores, sore throat, ear discharge, tooth pain, gum tenderness, taste disturbance, smell disturbance, hearing aid, bleeding gums, dry mouth, sinus pain, sinus congestion and neck mass.    Eyes:  Negative for double vision, pain, redness, eye pain, decreased vision, eye watering, eye bulging, eye dryness, flashing lights, spots, floaters, strabismus, tunnel vision, jaundice and eye irritation.   Respiratory:   Negative for cough, hemoptysis, sputum production, shortness of breath, wheezing, sleep disturbances due to breathing, snores loudly, respiratory pain, dyspnea on exertion, cough disturbing sleep and postural dyspnea.    Cardiovascular:  Negative for chest pain, dyspnea on exertion, palpitations, orthopnea, claudication, leg swelling, fingers/toes turn blue, hypertension, hypotension, syncope, history of heart murmur, chest pain on exertion, chest pain at rest, pacemaker, few scattered varicosities, leg pain, sleep disturbances due to breathing, tachycardia, light-headedness, exercise intolerance and edema.   Gastrointestinal:  Positive for heartburn, nausea, abdominal pain and bloating.   Genitourinary:  Positive for vaginal discharge, abnormal vaginal bleeding, excessive menstruation, menstrual changes and hot flashes. Negative for bladder incontinence, dysuria, urgency, hematuria, flank pain, difficulty urinating, nocturia and voiding less frequently.   Musculoskeletal:  Negative for myalgias, back pain, joint swelling, arthralgias, stiffness, muscle cramps, neck pain, bone pain, muscle weakness and fracture.   Skin:  Positive for rash, hair changes and acne.   Neurological:  Negative for dizziness, tingling, tremors, speech change, seizures, loss of consciousness, weakness, light-headedness, numbness, headaches, disturbances in coordination,  "extremity numbness, memory loss, difficulty walking and paralysis.   Endo/Heme:  Negative for anemia, swollen glands and bruises/bleeds easily.   Psychiatric/Behavioral:  Negative for depression, hallucinations, memory loss, decreased concentration, mood swings and panic attacks.    Breast:  Negative for breast discharge, breast mass, breast pain and nipple retraction.   Endocrine:  Positive for unwanted hair growth and change in facial hair.Negative for altered temperature regulation, polyphagia and polydipsia.      PHQ-9 SCORE 12/10/2021   PHQ-9 Total Score 1     LIS-7 SCORE 11/30/2021 12/10/2021   Total Score 0 (minimal anxiety) -   Total Score 0 0         EXAM:  Height 1.753 m (5' 9.02\"), weight 71.9 kg (158 lb 8 oz), last menstrual period 11/01/2021, not currently breastfeeding. Body mass index is 23.4 kg/m .  General - pleasant female in no acute distress.  Skin - no suspicious lesions or rashes  EENT-  PERRLA, euthyroid with out palpable nodules  Neck - supple without lymphadenopathy.  Lungs - clear to auscultation bilaterally.  Heart - regular rate and rhythm without murmur.  Abdomen - soft, nontender, nondistended, no masses or organomegaly noted.  Musculoskeletal - no gross deformities.  Neurological - normal strength, sensation, and mental status.    Breast Exam:  Breast: Without visible skin changes. No dimpling or lesions seen.   Breasts supple, non-tender with palpation, no dominant mass, nodularity, or nipple discharge noted bilaterally. Axillary nodes negative.      Pelvic Exam:  EG/BUS: Normal genital architecture without lesions, erythema or abnormal secretions Bartholin's, Urethra, [place]'s normal   Urethral meatus: normal   Urethra: no masses, tenderness, or scarring   Bladder: no masses or tenderness   Vagina: moist, pink, rugae with creamy, white and odorless  Secretions, wet prep collected  Cervix: Nulliparous, no lesions, pink, moist, closed, without lesion or CMT and IUD strings NOT " visualized, GC/CT and pap collected.   Uterus: anteverted,   Adnexa: Within normal limits and No masses, nodularity, tenderness  Rectum:anus normal       ASSESSMENT:  Encounter Diagnoses   Name Primary?     Encounter for preventive health examination      Screen for STD (sexually transmitted disease) Yes     Uterine cramping      Complication of intrauterine device (IUD), unspecified complication, initial encounter (H)         PLAN:   Orders Placed This Encounter   Procedures     Obtaining, preparing and conveyance of cervical or vaginal smear to laboratory.     US Transvaginal Non OB     HIV Antigen Antibody Combo     Treponema Abs w Reflex to RPR and Titer     Hepatitis B surface antigen     Hepatitis C antibody     Wet Prep POCT     No orders of the defined types were placed in this encounter.  -Recommended TVUS to identify the location of her IUD   -Discussed the criteria to diagnose PCOS - irregular cycles (anovulation), multiple cysts on her ovaries, and evidence of hyperandrogenism (acne, hirsutism, hair loss). She meets one of these criteria (excessive hair growth, acne). We will get an ultrasound to determine the location of her IUD and will be able to evaluate her ovaries then. Discussed that my suspicion is that she likely her hair growth is likely related to her heritage.   -Encouraged Rand to contact her dermatologist to discuss management of her acne and excessive hair growth   -Discussed the pathophysiology of endometriosis. Recommended we start with US evaluation to determine if IUD malpresentation is the cause of her pelvic pain.   -STD screen per pt request  -Cervical Cancer screening: If today's pap is normal will be due for her next pap in three years.     Annual exam and problem visit  Time spent:  Chart review/Pre-chartin on day of service  Face-to-face visit:  50 min   Charting: 10   Total time spent on day of service:  65  >50% of time spent on  education/counseling/care-coordination          Hilary Hdez, APRN, CNM    Answers for HPI/ROS submitted by the patient on 11/30/2021  LIS 7 TOTAL SCORE: 0

## 2021-12-11 LAB
C TRACH DNA SPEC QL NAA+PROBE: NEGATIVE
N GONORRHOEA DNA SPEC QL NAA+PROBE: NEGATIVE
T PALLIDUM AB SER QL: NONREACTIVE

## 2021-12-13 LAB
HBV SURFACE AG SERPL QL IA: NONREACTIVE
HCV AB SERPL QL IA: NONREACTIVE
HIV 1+2 AB+HIV1 P24 AG SERPL QL IA: NONREACTIVE

## 2021-12-14 ENCOUNTER — HOSPITAL ENCOUNTER (OUTPATIENT)
Dept: ULTRASOUND IMAGING | Facility: CLINIC | Age: 26
Discharge: HOME | End: 2021-12-14
Attending: ADVANCED PRACTICE MIDWIFE | Admitting: ADVANCED PRACTICE MIDWIFE
Payer: COMMERCIAL

## 2021-12-14 ENCOUNTER — MYC MEDICAL ADVICE (OUTPATIENT)
Dept: OBGYN | Facility: CLINIC | Age: 26
End: 2021-12-14

## 2021-12-14 DIAGNOSIS — N94.89 UTERINE CRAMPING: ICD-10-CM

## 2021-12-14 DIAGNOSIS — T83.9XXA COMPLICATION OF INTRAUTERINE DEVICE (IUD), UNSPECIFIED COMPLICATION, INITIAL ENCOUNTER (H): Primary | ICD-10-CM

## 2021-12-14 LAB
BKR LAB AP GYN ADEQUACY: NORMAL
BKR LAB AP GYN INTERPRETATION: NORMAL
BKR LAB AP HPV REFLEX: NO
BKR LAB AP PREVIOUS ABNORMAL: NORMAL
PATH REPORT.COMMENTS IMP SPEC: NORMAL
PATH REPORT.COMMENTS IMP SPEC: NORMAL
PATH REPORT.RELEVANT HX SPEC: NORMAL
RADIOLOGIST FLAGS: ABNORMAL

## 2021-12-14 PROCEDURE — 76830 TRANSVAGINAL US NON-OB: CPT

## 2021-12-14 PROCEDURE — 76830 TRANSVAGINAL US NON-OB: CPT | Mod: 26 | Performed by: RADIOLOGY

## 2021-12-14 PROCEDURE — 76856 US EXAM PELVIC COMPLETE: CPT | Mod: 26 | Performed by: RADIOLOGY

## 2021-12-15 NOTE — TELEPHONE ENCOUNTER
[facility] Call Center    Phone Message    May a detailed message be left on voicemail: yes     Reason for Call: Requesting Results   Name/type of test: US results  Date of test: 12/14/21  Was test done at a location other than [facility] (Please fill in the location if not [facility])?: No      Action Taken: Message routed to:  Other: whs rn    Travel Screening: Not Applicable

## 2021-12-15 NOTE — TELEPHONE ENCOUNTER
Called Rand and reviewed Dr. Sandhu's recommendation that Rand have a hysteroscopic removal of her IUD/IUD fragments. Rand would like to move forward with the removal. Will request that surgery orders be placed and request assistance with scheduling.    Discussed that the IUD is not effective for birth control prevention. She could consider having another IUD placed at that time if she desires.     Reviewed that she has a 1cm complex cyst on her ovary. Recommended repeating an US in three months.       JALYN Chaudhary, CRISPINM

## 2021-12-16 DIAGNOSIS — T83.31XA MECHANICAL BREAKDOWN OF INTRAUTERINE CONTRACEPTIVE DEVICE, INITIAL ENCOUNTER: Primary | ICD-10-CM

## 2021-12-16 RX ORDER — ACETAMINOPHEN 325 MG/1
975 TABLET ORAL ONCE
Status: CANCELLED | OUTPATIENT
Start: 2021-12-16 | End: 2021-12-16

## 2021-12-20 ENCOUNTER — TELEPHONE (OUTPATIENT)
Dept: OBGYN | Facility: CLINIC | Age: 26
End: 2021-12-20
Payer: COMMERCIAL

## 2021-12-20 DIAGNOSIS — Z11.59 ENCOUNTER FOR SCREENING FOR OTHER VIRAL DISEASES: Primary | ICD-10-CM

## 2021-12-20 DIAGNOSIS — N94.89 UTERINE PAIN: ICD-10-CM

## 2021-12-20 DIAGNOSIS — Z11.59 ENCOUNTER FOR SCREENING FOR OTHER VIRAL DISEASES: ICD-10-CM

## 2021-12-20 RX ORDER — OXYCODONE HYDROCHLORIDE 5 MG/1
5 TABLET ORAL EVERY 6 HOURS PRN
Qty: 6 TABLET | Refills: 0 | Status: SHIPPED | OUTPATIENT
Start: 2021-12-20 | End: 2021-12-23

## 2021-12-20 NOTE — TELEPHONE ENCOUNTER
Spoke with patient to schedule surgery due to increased pain per [employer] messages to triage nurses and Hilary Massey CNM. OR availability is extremely limited, therefore Dr. Forrest was consulted on whether or not the case could be rescheduled on L&D schedule or if patient should present to ED. Okay to be scheduled on L&D as long as no other procedures are scheduled. OR has wide availability for 12/24 and therefore procedure was scheduled for AM of this date with Dr. Melchor.    Patient is aware of date, time, location, need for covid test within four days of procedure and prep instructions.    Type of surgery: HYSTEROSCOPY, DIAGNOSTIC, WITH DILATION AND CURETTAGE OF UTERUS, removal of IUD  Location of surgery: [facility] OR  Date and time of surgery: 12/24/21 at 8:30am  Surgeon: Linn Melchor MD  Pre-Op Appt Date: to be done on DOS  Post-Op Appt Date: 1/20/22 at 4:30pm   Packet sent out: No - patient opting for [employer] message due to soon date  Pre-cert/Authorization completed:  Yes  Date: 12/20/21    Camille Batista  Clinical Services Assistant

## 2021-12-20 NOTE — TELEPHONE ENCOUNTER
"Spoke with Rand, 27 yo G0 who sent a [employer] message sharing that she is in exquisite pain. She was seen 12/10/21 for IUD concerns. She had had increased bleeding and \"\"extreme\" cramping with her IUD. No strings were visualized during that exam. US shows: malpresented IUD with possible IUD fragments in her endometrium.     She was reviewed with her 12/15/21 and an order was placed for a hysteroscopic removal.     Rand reports that yesterday her pain was a 8/10 after she had taken 800mg of ibuprofen. She is hoping that she can get scheduled soon for the procedure. She is undecided regarding whether or not she wants to have another IUD placed. She feels like it has been an effective method previously in controlling her heavy menstrual cycles.      She is asking for pain medication. Reviewed recommendation that she take 800 mg of ibuprofen every 8 hours and 500 to 1000 mg of Tylenol in between that time.  If she has breakthrough pain.  Recommend that she also use a heating pack as needed.  If she continues to have pain will prescribe 5 mg of oxycodone.  Reviewed that people can become tolerant to narcotics and need more to have the same effect and that they can be addictive.  Additionally reviewed that it may not be safe for her to operate machinery, such as driving, while she is taking this medication. Rand would like to avoid taking narcotics but does request the prescription given her level of pain.  She is asking for 6 tablets only to be sent to her pharmacy.  She will call if she needs more tablets but would rather not have to deal with finding a way to responsibly dispose of them.    Also discussed that I will contact our nursing staff and request that this issue gets elevated due to her level of pain.       Hilary Hdez, JALYN, KRIS      "

## 2021-12-20 NOTE — LETTER
December 20, 2021    Rand Alvarado   [address] [facility] [address]       Dear Rand Alvarado,    Thank you for choosing [facility] for your surgical procedure.  You will enter the hospital as an outpatient, or same-day surgery patient, which means that you will enter the hospital the day of your surgery and leave that same day.    You procedure is scheduled on:    Date: Friday December 24th, 2021    Time: 8:30am    Please arrive at: 6:30am    Procedure: HYSTEROSCOPY, DIAGNOSTIC, WITH DILATION AND CURETTAGE OF UTERUS, removal of IUD    MD: Linn Melchor MD    Go to:  The main hospital entrance ([facility] entrance) is located on [address] ([address] [facility] [address])  There is signage in the lobby directing you to check in on 3rd floor of the [facility].       parking is available (no charge to park your car, regular parking rates do apply). You may also self park your car in the Green garage - the entrance is located on [address].     Please keep your ticket with you as your ticket will be validated to give you a reduced rate for the parking garage.     NOTE: The times noted above may change.  A nurse from the hospital pre-admission department will call to confirm your procedure.  He or she will answer any questions you have about the procedure and will provide you with instructions for taking medications the day of the procedure.  If you have not received a call, or if you have more questions please call us one working day before your procedure.  Call pre-admission department at [phone].  If you need to cancel or reschedule your surgery please call [phone].    FOLLOW-UP/POST OPERATIVE VISIT    Please call now and schedule a follow-up postoperative appointment with your surgeon.     Your follow-up/postoperative visit is due approximately 2 weeks after your procedure.    Your postoperative visit  is scheduled for Thursday January 20th, 2022 at 4:30pm at the [facility].     GETTING READY FOR SURGERY    You must have a COVID-19 test within four days (96 hours) of your procedure. The phone number for our COVID-19 test scheduling department is [phone].    *If you get a fever, cold, or rash please call the [facility] Triage Nurses at [phone] - we may need to postpone your procedure.    TEN DAYS BEFORE SURGERY    [place] with the hospital 10 days before your procedure date.     Have your insurance card ready.     To register online, go to [email or web address].     You may also call the hospital Pre Registration Department at [phone].    THE DAY BEFORE SURGERY    For a same-day procedure, you must arrange for an adult to take you home from the hospital. You cannot drive, take a cab, or ride a bus home by yourself.  An adult must stay with you for the first 24 hours after your procedure.    If you smoke - quit or at least cut down.    Stop drinking alcohol (liquor, beer, and wine) at least 24 hours before your procedure.    Shower or bathe the night before and the morning of your procedure.  Use an antiseptic surgical soap such as Hibiclens, Scrub Care or Exidine. You can find it at your local pharmacy..  If your doctor does not give you a special soap, buy Hibiclens or Lisa-Star at the drug store.  You can also ask your pharmacist to recommend an antiseptic alternative soap.    Do not put on lotion, powder, perfume, deodorant, or make-up after bathing.    Remove all nail polish.    THE DAY OF SURGERY    Have nothing to eat or drink eight hours before your procedure.    You may drink water up until two hours before your procedure.    Nothing by mouth within two hours of your procedure, including gum, candy and mints.    Take prescription medicines as instructed by the hospital preadmissions nursing staff.    Alternatively you  should follow any directives you receive from the MD doing your procedure.     Do not take insulin or oral diabetes medicine on the day of the surgery.     Take off jewelry, including rings and body piercings.    Leave valuables at home.    Bring these items to the hospital:  1. Insurance cards.  2. Forms your doctor asked you to bring.  3. Health care directive or living will, if you have one.    If you are under 18, a parent or legal guardian must come with you to the hospital.      Sincerely,      [facility] [place]    If you are hard of hearing, please let us know. We provide many free services including sign language and oral interpreters, TTYs, telephone amplifiers, note takers, and written materials.

## 2021-12-21 ENCOUNTER — LAB (OUTPATIENT)
Dept: LAB | Facility: CLINIC | Age: 26
End: 2021-12-21
Attending: OBSTETRICS & GYNECOLOGY
Payer: COMMERCIAL

## 2021-12-21 DIAGNOSIS — Z11.59 ENCOUNTER FOR SCREENING FOR OTHER VIRAL DISEASES: ICD-10-CM

## 2021-12-21 PROCEDURE — U0005 INFEC AGEN DETEC AMPLI PROBE: HCPCS

## 2021-12-21 PROCEDURE — U0003 INFECTIOUS AGENT DETECTION BY NUCLEIC ACID (DNA OR RNA); SEVERE ACUTE RESPIRATORY SYNDROME CORONAVIRUS 2 (SARS-COV-2) (CORONAVIRUS DISEASE [COVID-19]), AMPLIFIED PROBE TECHNIQUE, MAKING USE OF HIGH THROUGHPUT TECHNOLOGIES AS DESCRIBED BY CMS-2020-01-R: HCPCS

## 2021-12-22 LAB — SARS-COV-2 RNA RESP QL NAA+PROBE: NEGATIVE

## 2021-12-23 ENCOUNTER — ANESTHESIA EVENT (OUTPATIENT)
Dept: SURGERY | Facility: CLINIC | Age: 26
End: 2021-12-23
Payer: COMMERCIAL

## 2021-12-24 ENCOUNTER — HOSPITAL ENCOUNTER (OUTPATIENT)
Facility: CLINIC | Age: 26
Discharge: HOME | End: 2021-12-24
Attending: OBSTETRICS & GYNECOLOGY | Admitting: OBSTETRICS & GYNECOLOGY
Payer: COMMERCIAL

## 2021-12-24 ENCOUNTER — ANESTHESIA (OUTPATIENT)
Dept: SURGERY | Facility: CLINIC | Age: 26
End: 2021-12-24
Payer: COMMERCIAL

## 2021-12-24 VITALS
HEIGHT: 69 IN | RESPIRATION RATE: 16 BRPM | DIASTOLIC BLOOD PRESSURE: 67 MMHG | OXYGEN SATURATION: 98 % | HEART RATE: 63 BPM | TEMPERATURE: 98.6 F | WEIGHT: 158.29 LBS | SYSTOLIC BLOOD PRESSURE: 112 MMHG | BODY MASS INDEX: 23.44 KG/M2

## 2021-12-24 DIAGNOSIS — T83.31XA MECHANICAL BREAKDOWN OF INTRAUTERINE CONTRACEPTIVE DEVICE, INITIAL ENCOUNTER: ICD-10-CM

## 2021-12-24 LAB
HCG SERPL QL: NEGATIVE
HGB BLD-MCNC: 13.1 G/DL (ref 11.7–15.7)

## 2021-12-24 PROCEDURE — 85018 HEMOGLOBIN: CPT | Performed by: OBSTETRICS & GYNECOLOGY

## 2021-12-24 PROCEDURE — 250N000009 HC RX 250: Performed by: ANESTHESIOLOGY

## 2021-12-24 PROCEDURE — 250N000009 HC RX 250: Performed by: NURSE ANESTHETIST, CERTIFIED REGISTERED

## 2021-12-24 PROCEDURE — 36415 COLL VENOUS BLD VENIPUNCTURE: CPT | Performed by: OBSTETRICS & GYNECOLOGY

## 2021-12-24 PROCEDURE — 999N000141 HC STATISTIC PRE-PROCEDURE NURSING ASSESSMENT: Performed by: OBSTETRICS & GYNECOLOGY

## 2021-12-24 PROCEDURE — 250N000013 HC RX MED GY IP 250 OP 250 PS 637: Performed by: OBSTETRICS & GYNECOLOGY

## 2021-12-24 PROCEDURE — 58300 INSERT INTRAUTERINE DEVICE: CPT | Performed by: OBSTETRICS & GYNECOLOGY

## 2021-12-24 PROCEDURE — 360N000076 HC SURGERY LEVEL 3, PER MIN: Performed by: OBSTETRICS & GYNECOLOGY

## 2021-12-24 PROCEDURE — 250N000011 HC RX IP 250 OP 636: Performed by: NURSE ANESTHETIST, CERTIFIED REGISTERED

## 2021-12-24 PROCEDURE — 84703 CHORIONIC GONADOTROPIN ASSAY: CPT | Performed by: OBSTETRICS & GYNECOLOGY

## 2021-12-24 PROCEDURE — 250N000009 HC RX 250: Performed by: OBSTETRICS & GYNECOLOGY

## 2021-12-24 PROCEDURE — 272N000001 HC OR GENERAL SUPPLY STERILE: Performed by: OBSTETRICS & GYNECOLOGY

## 2021-12-24 PROCEDURE — 258N000003 HC RX IP 258 OP 636: Performed by: NURSE ANESTHETIST, CERTIFIED REGISTERED

## 2021-12-24 PROCEDURE — 58562 HYSTEROSCOPY REMOVE FB: CPT | Performed by: OBSTETRICS & GYNECOLOGY

## 2021-12-24 PROCEDURE — 710N000012 HC RECOVERY PHASE 2, PER MINUTE: Performed by: OBSTETRICS & GYNECOLOGY

## 2021-12-24 PROCEDURE — 370N000017 HC ANESTHESIA TECHNICAL FEE, PER MIN: Performed by: OBSTETRICS & GYNECOLOGY

## 2021-12-24 RX ORDER — FENTANYL CITRATE 50 UG/ML
INJECTION, SOLUTION INTRAMUSCULAR; INTRAVENOUS PRN
Status: DISCONTINUED | OUTPATIENT
Start: 2021-12-24 | End: 2021-12-24

## 2021-12-24 RX ORDER — PROPOFOL 10 MG/ML
INJECTION, EMULSION INTRAVENOUS CONTINUOUS PRN
Status: DISCONTINUED | OUTPATIENT
Start: 2021-12-24 | End: 2021-12-24

## 2021-12-24 RX ORDER — SCOLOPAMINE TRANSDERMAL SYSTEM 1 MG/1
1 PATCH, EXTENDED RELEASE TRANSDERMAL
Status: DISCONTINUED | OUTPATIENT
Start: 2021-12-24 | End: 2021-12-24 | Stop reason: HOSPADM

## 2021-12-24 RX ORDER — DEXAMETHASONE SODIUM PHOSPHATE 4 MG/ML
INJECTION, SOLUTION INTRA-ARTICULAR; INTRALESIONAL; INTRAMUSCULAR; INTRAVENOUS; SOFT TISSUE PRN
Status: DISCONTINUED | OUTPATIENT
Start: 2021-12-24 | End: 2021-12-24

## 2021-12-24 RX ORDER — KETOROLAC TROMETHAMINE 30 MG/ML
INJECTION, SOLUTION INTRAMUSCULAR; INTRAVENOUS PRN
Status: DISCONTINUED | OUTPATIENT
Start: 2021-12-24 | End: 2021-12-24

## 2021-12-24 RX ORDER — ONDANSETRON 2 MG/ML
INJECTION INTRAMUSCULAR; INTRAVENOUS PRN
Status: DISCONTINUED | OUTPATIENT
Start: 2021-12-24 | End: 2021-12-24

## 2021-12-24 RX ORDER — LIDOCAINE HYDROCHLORIDE 10 MG/ML
INJECTION, SOLUTION INFILTRATION; PERINEURAL PRN
Status: DISCONTINUED | OUTPATIENT
Start: 2021-12-24 | End: 2021-12-24 | Stop reason: HOSPADM

## 2021-12-24 RX ORDER — SODIUM CHLORIDE, SODIUM LACTATE, POTASSIUM CHLORIDE, CALCIUM CHLORIDE 600; 310; 30; 20 MG/100ML; MG/100ML; MG/100ML; MG/100ML
INJECTION, SOLUTION INTRAVENOUS CONTINUOUS PRN
Status: DISCONTINUED | OUTPATIENT
Start: 2021-12-24 | End: 2021-12-24

## 2021-12-24 RX ORDER — LIDOCAINE HYDROCHLORIDE 20 MG/ML
INJECTION, SOLUTION INFILTRATION; PERINEURAL PRN
Status: DISCONTINUED | OUTPATIENT
Start: 2021-12-24 | End: 2021-12-24

## 2021-12-24 RX ORDER — PROPOFOL 10 MG/ML
INJECTION, EMULSION INTRAVENOUS PRN
Status: DISCONTINUED | OUTPATIENT
Start: 2021-12-24 | End: 2021-12-24

## 2021-12-24 RX ORDER — ACETAMINOPHEN 325 MG/1
975 TABLET ORAL ONCE
Status: DISCONTINUED | OUTPATIENT
Start: 2021-12-24 | End: 2021-12-24 | Stop reason: HOSPADM

## 2021-12-24 RX ORDER — ACETAMINOPHEN 325 MG/1
975 TABLET ORAL ONCE
Status: COMPLETED | OUTPATIENT
Start: 2021-12-24 | End: 2021-12-24

## 2021-12-24 RX ADMIN — SCOPALAMINE 1 PATCH: 1 PATCH, EXTENDED RELEASE TRANSDERMAL at 11:50

## 2021-12-24 RX ADMIN — PROPOFOL 200 MCG/KG/MIN: 10 INJECTION, EMULSION INTRAVENOUS at 12:02

## 2021-12-24 RX ADMIN — FENTANYL CITRATE 25 MCG: 50 INJECTION, SOLUTION INTRAMUSCULAR; INTRAVENOUS at 12:00

## 2021-12-24 RX ADMIN — ACETAMINOPHEN 975 MG: 325 TABLET, FILM COATED ORAL at 11:19

## 2021-12-24 RX ADMIN — PROPOFOL 50 MG: 10 INJECTION, EMULSION INTRAVENOUS at 12:01

## 2021-12-24 RX ADMIN — LIDOCAINE HYDROCHLORIDE 60 MG: 20 INJECTION, SOLUTION INFILTRATION; PERINEURAL at 12:00

## 2021-12-24 RX ADMIN — ONDANSETRON 4 MG: 2 INJECTION INTRAMUSCULAR; INTRAVENOUS at 12:00

## 2021-12-24 RX ADMIN — PROPOFOL 50 MG: 10 INJECTION, EMULSION INTRAVENOUS at 12:03

## 2021-12-24 RX ADMIN — SODIUM CHLORIDE, POTASSIUM CHLORIDE, SODIUM LACTATE AND CALCIUM CHLORIDE: 600; 310; 30; 20 INJECTION, SOLUTION INTRAVENOUS at 11:54

## 2021-12-24 RX ADMIN — DEXAMETHASONE SODIUM PHOSPHATE 4 MG: 4 INJECTION, SOLUTION INTRAMUSCULAR; INTRAVENOUS at 12:18

## 2021-12-24 RX ADMIN — PROPOFOL 40 MG: 10 INJECTION, EMULSION INTRAVENOUS at 12:07

## 2021-12-24 RX ADMIN — KETOROLAC TROMETHAMINE 30 MG: 30 INJECTION, SOLUTION INTRAMUSCULAR at 12:20

## 2021-12-24 ASSESSMENT — MIFFLIN-ST. JEOR: SCORE: 1532.38

## 2021-12-24 NOTE — ANESTHESIA CARE TRANSFER NOTE
Patient: Rand Alvarado    Procedure: Procedure(s):  HYSTEROSCOPY, DIAGNOSTIC, WITH DILATION AND CURETTAGE OF UTERUS,  REMOVAL, INTRAUTERINE DEVICE  insert intrauterine device       Diagnosis: Mechanical breakdown of intrauterine contraceptive device, initial encounter [T83.31XA]  Diagnosis Additional Information: No value filed.    Anesthesia Type:   MAC     Note:    Oropharynx: oropharynx clear of all foreign objects and spontaneously breathing  Level of Consciousness: awake  Oxygen Supplementation: room air    Independent Airway: airway patency satisfactory and stable  Dentition: dentition unchanged  Vital Signs Stable: post-procedure vital signs reviewed and stable  Report to RN Given: handoff report given  Patient transferred to: Phase II    Handoff Report: Identifed the Patient, Identified the Reponsible Provider, Reviewed the pertinent medical history, Discussed the surgical course, Reviewed Intra-OP anesthesia mangement and issues during anesthesia, Set expectations for post-procedure period and Allowed opportunity for questions and acknowledgement of understanding      Vitals:  Vitals Value Taken Time   /61 12/24/21 1231   Temp     Pulse 77 12/24/21 1231   Resp     SpO2 98 % 12/24/21 1234   Vitals shown include unvalidated device data.    Electronically Signed By: ZELDA CONTRERAS APRN CRNA  December 24, 2021  12:35 PM

## 2021-12-24 NOTE — DISCHARGE INSTRUCTIONS
Same-Day Surgery   Adult Discharge Orders & Instructions     For 24 hours after surgery:  1. Get plenty of rest.  A responsible adult must stay with you for at least 24 hours after you leave the hospital.   2. Pain medication can slow your reflexes. Do not drive or use heavy equipment.  If you have weakness or tingling, don't drive or use heavy equipment until this feeling goes away.  3. Mixing alcohol and pain medication can cause dizziness and slow your breathing. It can even be fatal. Do not drink alcohol while taking pain medication.  4. Avoid strenuous or risky activities.  Ask for help when climbing stairs.   5. You may feel lightheaded.  If so, sit for a few minutes before standing.  Have someone help you get up.   6. If you have nausea (feel sick to your stomach), drink only clear liquids such as apple juice, ginger ale, broth or 7-Up.  Rest may also help.  Be sure to drink enough fluids.  Move to a regular diet as you feel able. Take pain medications with a small amount of solid food, such as toast or crackers, to avoid nausea.   7. A slight fever is normal. Call the doctor if your fever is over 100 F (37.7 C) (taken under the tongue) or lasts longer than 24 hours.  8. You may have a dry mouth, muscle aches, trouble sleeping or a sore throat.  These symptoms should go away after 24 hours.  9. Do not make important or legal decisions.   Pain Management:      1. Take pain medication (if prescribed) for pain as directed by your physician.        2. WARNING: If the pain medication you have been prescribed contains Tylenol  (acetaminophen), DO NOT take additional doses of Tylenol (acetaminophen).     Call your doctor for any of the followin.  Signs of infection (fever, growing tenderness at the surgery site, severe pain, a large amount of drainage or bleeding, foul-smelling drainage, redness, swelling).    2.  It has been over 8 to 10 hours since surgery and you are still not able to urinate (pee).    3.   Headache for over 24 hours.    4.  Numbness, tingling or weakness the day after surgery (if you had spinal anesthesia).  To contact a doctor, call ____Dr. Linn Melchor, OB/GYN Clinic, [phone]_________________________________ or:      [phone] and ask for the Resident On Call for:          __________OBGYN________________________________ (answered 24 hours a day)      Emergency Department:  [place] [facility]: [phone]  [place] [facility]: [phone]

## 2021-12-24 NOTE — ANESTHESIA POSTPROCEDURE EVALUATION
Patient: Rand Alvarado    Procedure: Procedure(s):  HYSTEROSCOPY, DIAGNOSTIC, WITH DILATION AND CURETTAGE OF UTERUS,  REMOVAL, INTRAUTERINE DEVICE  insert intrauterine device       Diagnosis:Mechanical breakdown of intrauterine contraceptive device, initial encounter [T83.31XA]  Diagnosis Additional Information: No value filed.    Anesthesia Type:  MAC    Note:     Postop Pain Control: Uneventful            Sign Out: Well controlled pain   PONV: No   Neuro/Psych: Uneventful            Sign Out: Acceptable/Baseline neuro status   Airway/Respiratory: Uneventful            Sign Out: Acceptable/Baseline resp. status   CV/Hemodynamics: Uneventful            Sign Out: Acceptable CV status; No obvious hypovolemia; No obvious fluid overload   Other NRE: NONE   DID A NON-ROUTINE EVENT OCCUR? No           Last vitals:  Vitals Value Taken Time   /67 12/24/21 1300   Temp 37  C (98.6  F) 12/24/21 1300   Pulse 63 12/24/21 1300   Resp 16 12/24/21 1300   SpO2 98 % 12/24/21 1315   Vitals shown include unvalidated device data.    Electronically Signed By: Mendez Gilbert DO  December 24, 2021  2:51 PM

## 2021-12-24 NOTE — OP NOTE
Operative Report    Preop Dx: malpositioned Mirena IUD  Postop Dx: Same  Procedure: Hysteroscopy, removal of Mirena IUD, replacement of Mirena IUD  Surgeon: Linn Melchor MD  Anesthesia; MAC  IVF: 700cc LR  Fluid Deficit: 60cc NS  Specimens: None  Complications: None apparent  Condition: Stable to PACU  Findings: Intact Mirena IUD with fibrinous material vs endometrium encasing IUD along anterior uterine wall. Strings wrapped around IUD. Normal uterine cavity. Uterine legnth 8cm.     Description of Procedure: Patient was taken to the operating room where MAC anesthesia was administered. She was prepped and draped in dorsal supine position. Medium graves speculum was inserted and cervix was grasped with single tooth tenaculum. Paracervical block performed at 4/8 o'clock with 10cc 1% lidocaine. Cervix serially dilated to 6mm. Hysteroscope inserted with above findings. Hysteroscopic graspers used to remove IUD intact. Hysteroscope reinserted for cavity evaluation with normal appearing cavity. Hysteroscope removed and uterus sounded to 8cm. Mirena IUD inserted in standard fashion, strings trimmed to 3cm. Tenaculum removed and pressure used for hemostasis. Speculum removed. All counts were correct x 2. Patient awakened and transferred to recovery room in stable condition.     Linn Melchor MD

## 2022-01-16 ENCOUNTER — HEALTH MAINTENANCE LETTER (OUTPATIENT)
Age: 27
End: 2022-01-16

## 2022-01-20 ENCOUNTER — OFFICE VISIT (OUTPATIENT)
Dept: OBGYN | Facility: CLINIC | Age: 27
End: 2022-01-20
Attending: OBSTETRICS & GYNECOLOGY
Payer: COMMERCIAL

## 2022-01-20 VITALS
BODY MASS INDEX: 23.58 KG/M2 | HEART RATE: 88 BPM | HEIGHT: 69 IN | WEIGHT: 159.2 LBS | DIASTOLIC BLOOD PRESSURE: 84 MMHG | SYSTOLIC BLOOD PRESSURE: 126 MMHG

## 2022-01-20 DIAGNOSIS — N83.202 LEFT OVARIAN CYST: Primary | ICD-10-CM

## 2022-01-20 PROCEDURE — 99213 OFFICE O/P EST LOW 20 MIN: CPT | Performed by: OBSTETRICS & GYNECOLOGY

## 2022-01-20 PROCEDURE — G0463 HOSPITAL OUTPT CLINIC VISIT: HCPCS

## 2022-01-20 RX ORDER — SPIRONOLACTONE 50 MG/1
TABLET, FILM COATED ORAL
COMMUNITY
Start: 2021-12-31 | End: 2023-03-09 | Stop reason: DRUGHIGH

## 2022-01-20 ASSESSMENT — MIFFLIN-ST. JEOR: SCORE: 1536.51

## 2022-01-25 NOTE — PROGRESS NOTES
"[facility]    CC: Postop visit    S: 26 year old P0 here for postop visit from hysteroscopy, IUD removal and replacement. No concerns since procedure. Denies any pain, bleeding has resolved. Happy with IUD. Wonders about follow up for ovarian cyst.     O: /84 (BP Location: Left arm, Patient Position: Chair)   Pulse 88   Ht 1.753 m (5' 9\")   Wt 72.2 kg (159 lb 3.2 oz)   BMI 23.51 kg/m    General: No distress  Abdomen: Soft, non-tender, non-distended, no masses  Pelvic Exam:  Vulva: No external lesions, normal hair distribution, normal architecture  Vagina: Moist, pink, no abnormal discharge, well rugated, no lesions  Cervix: smooth, pink, no visible lesions- IUD strings visualized.   Uterus: Normal size, anteverted, non-tender, mobile  Adnexa: Non-tender, no masses    A:26 year old s/p removal of malpositioned IUD and placement of new Mirena IUD    P: Reviewed operative findings  IUD normal on exam  Repeat US ordered for complex cyst      Linn Melchor MD FACOG  "

## 2022-11-03 ENCOUNTER — ANCILLARY PROCEDURE (OUTPATIENT)
Dept: ULTRASOUND IMAGING | Facility: CLINIC | Age: 27
End: 2022-11-03
Attending: OBSTETRICS & GYNECOLOGY
Payer: COMMERCIAL

## 2022-11-03 DIAGNOSIS — N83.202 LEFT OVARIAN CYST: ICD-10-CM

## 2022-11-03 PROCEDURE — 76830 TRANSVAGINAL US NON-OB: CPT | Mod: 26 | Performed by: OBSTETRICS & GYNECOLOGY

## 2022-11-03 PROCEDURE — 76830 TRANSVAGINAL US NON-OB: CPT

## 2023-01-14 ENCOUNTER — HEALTH MAINTENANCE LETTER (OUTPATIENT)
Age: 28
End: 2023-01-14

## 2023-03-02 ASSESSMENT — ENCOUNTER SYMPTOMS
JOINT SWELLING: 0
HEADACHES: 0
COUGH: 0
MYALGIAS: 0
HEARTBURN: 0
HEMATURIA: 0
SHORTNESS OF BREATH: 0
HEMATOCHEZIA: 0
WEAKNESS: 0
NERVOUS/ANXIOUS: 0
PALPITATIONS: 0
CHILLS: 0
DIARRHEA: 0
SORE THROAT: 0
CONSTIPATION: 1
DYSURIA: 0
FREQUENCY: 0
ARTHRALGIAS: 0
BREAST MASS: 0
NAUSEA: 0
EYE PAIN: 0
PARESTHESIAS: 0
FEVER: 0
ABDOMINAL PAIN: 0
DIZZINESS: 0

## 2023-03-02 ASSESSMENT — ASTHMA QUESTIONNAIRES
QUESTION_4 LAST FOUR WEEKS HOW OFTEN HAVE YOU USED YOUR RESCUE INHALER OR NEBULIZER MEDICATION (SUCH AS ALBUTEROL): NOT AT ALL
QUESTION_2 LAST FOUR WEEKS HOW OFTEN HAVE YOU HAD SHORTNESS OF BREATH: NOT AT ALL
QUESTION_3 LAST FOUR WEEKS HOW OFTEN DID YOUR ASTHMA SYMPTOMS (WHEEZING, COUGHING, SHORTNESS OF BREATH, CHEST TIGHTNESS OR PAIN) WAKE YOU UP AT NIGHT OR EARLIER THAN USUAL IN THE MORNING: NOT AT ALL
QUESTION_1 LAST FOUR WEEKS HOW MUCH OF THE TIME DID YOUR ASTHMA KEEP YOU FROM GETTING AS MUCH DONE AT WORK, SCHOOL OR AT HOME: NONE OF THE TIME
ACT_TOTALSCORE: 25
ACT_TOTALSCORE: 25
QUESTION_5 LAST FOUR WEEKS HOW WOULD YOU RATE YOUR ASTHMA CONTROL: COMPLETELY CONTROLLED

## 2023-03-09 ENCOUNTER — OFFICE VISIT (OUTPATIENT)
Dept: FAMILY MEDICINE | Facility: CLINIC | Age: 28
End: 2023-03-09
Payer: COMMERCIAL

## 2023-03-09 VITALS
OXYGEN SATURATION: 99 % | SYSTOLIC BLOOD PRESSURE: 117 MMHG | WEIGHT: 166.2 LBS | BODY MASS INDEX: 24.62 KG/M2 | TEMPERATURE: 98.3 F | HEART RATE: 70 BPM | HEIGHT: 69 IN | DIASTOLIC BLOOD PRESSURE: 76 MMHG

## 2023-03-09 DIAGNOSIS — L70.0 ACNE VULGARIS: ICD-10-CM

## 2023-03-09 DIAGNOSIS — Z00.00 ROUTINE GENERAL MEDICAL EXAMINATION AT A HEALTH CARE FACILITY: Primary | ICD-10-CM

## 2023-03-09 LAB
ANION GAP SERPL CALCULATED.3IONS-SCNC: 6 MMOL/L (ref 3–14)
BUN SERPL-MCNC: 10 MG/DL (ref 7–30)
CALCIUM SERPL-MCNC: 9.5 MG/DL (ref 8.5–10.1)
CHLORIDE BLD-SCNC: 106 MMOL/L (ref 94–109)
CO2 SERPL-SCNC: 26 MMOL/L (ref 20–32)
CREAT SERPL-MCNC: 0.82 MG/DL (ref 0.52–1.04)
GFR SERPL CREATININE-BSD FRML MDRD: >90 ML/MIN/1.73M2
GLUCOSE BLD-MCNC: 112 MG/DL (ref 70–99)
POTASSIUM BLD-SCNC: 3.5 MMOL/L (ref 3.4–5.3)
SODIUM SERPL-SCNC: 138 MMOL/L (ref 133–144)

## 2023-03-09 PROCEDURE — 99395 PREV VISIT EST AGE 18-39: CPT | Mod: 25 | Performed by: PHYSICIAN ASSISTANT

## 2023-03-09 PROCEDURE — 80048 BASIC METABOLIC PNL TOTAL CA: CPT | Performed by: PHYSICIAN ASSISTANT

## 2023-03-09 PROCEDURE — 90686 IIV4 VACC NO PRSV 0.5 ML IM: CPT | Performed by: PHYSICIAN ASSISTANT

## 2023-03-09 PROCEDURE — 99213 OFFICE O/P EST LOW 20 MIN: CPT | Mod: 25 | Performed by: PHYSICIAN ASSISTANT

## 2023-03-09 PROCEDURE — 91312 COVID-19 VACCINE BIVALENT BOOSTER 12+ (PFIZER): CPT | Performed by: PHYSICIAN ASSISTANT

## 2023-03-09 PROCEDURE — 0124A COVID-19 VACCINE BIVALENT BOOSTER 12+ (PFIZER): CPT | Performed by: PHYSICIAN ASSISTANT

## 2023-03-09 PROCEDURE — 90471 IMMUNIZATION ADMIN: CPT | Performed by: PHYSICIAN ASSISTANT

## 2023-03-09 PROCEDURE — 36415 COLL VENOUS BLD VENIPUNCTURE: CPT | Performed by: PHYSICIAN ASSISTANT

## 2023-03-09 RX ORDER — SPIRONOLACTONE 100 MG/1
TABLET, FILM COATED ORAL
COMMUNITY
Start: 2022-07-05 | End: 2023-03-10

## 2023-03-09 ASSESSMENT — ENCOUNTER SYMPTOMS
JOINT SWELLING: 0
DIZZINESS: 0
ARTHRALGIAS: 0
DYSURIA: 0
CONSTIPATION: 1
PARESTHESIAS: 0
HEADACHES: 0
NAUSEA: 0
CHILLS: 0
COUGH: 0
EYE PAIN: 0
WEAKNESS: 0
MYALGIAS: 0
DIARRHEA: 0
FEVER: 0
HEMATURIA: 0
SHORTNESS OF BREATH: 0
NERVOUS/ANXIOUS: 0
PALPITATIONS: 0
HEARTBURN: 0
FREQUENCY: 0
SORE THROAT: 0
BREAST MASS: 0
HEMATOCHEZIA: 0
ABDOMINAL PAIN: 0

## 2023-03-09 NOTE — PROGRESS NOTES
SUBJECTIVE:   CC: Rand is an 27 year old who presents for preventive health visit.     Patient has been advised of split billing requirements and indicates understanding: Yes  Healthy Habits:     Getting at least 3 servings of Calcium per day:  NO    Bi-annual eye exam:  Yes    Dental care twice a year:  NO    Sleep apnea or symptoms of sleep apnea:  None    Diet:  Breakfast skipped and Other    Frequency of exercise:  1 day/week    Duration of exercise:  30-45 minutes    Taking medications regularly:  Yes    Medication side effects:  Muscle aches and Other    PHQ-2 Total Score: 0    Additional concerns today:  Yes      Takes magnesium for constipation.     MA at [facility]     Acne -  Taking 100 mg spironolactone. Would like to increase to 150mg.     Constipation  Duration of complaint: January 2022 since starting Spironolactone      Today's PHQ-2 Score:   PHQ-2 ( 1999 [employer]) 3/2/2023   Q1: Little interest or pleasure in doing things 0   Q2: Feeling down, depressed or hopeless 0   PHQ-2 Score 0   PHQ-2 Total Score (12-17 Years)- Positive if 3 or more points; Administer PHQ-A if positive -   Q1: Little interest or pleasure in doing things Not at all   Q2: Feeling down, depressed or hopeless Not at all   PHQ-2 Score 0       Have you ever done Advance Care Planning? (For example, a Health Directive, POLST, or a discussion with a medical provider or your loved ones about your wishes): No, advance care planning information given to patient to review.  Patient declined advance care planning discussion at this time.    Social History     Tobacco Use     Smoking status: Never     Smokeless tobacco: Never   Substance Use Topics     Alcohol use: Yes     Comment: socially          Alcohol Use 3/2/2023   Prescreen: >3 drinks/day or >7 drinks/week? No       Reviewed orders with patient.  Reviewed health maintenance and updated orders accordingly - Yes  BP Readings from Last 3 Encounters:   03/09/23  "117/76   01/20/22 126/84   12/24/21 112/67    Wt Readings from Last 3 Encounters:   03/09/23 75.4 kg (166 lb 3.2 oz)   01/20/22 72.2 kg (159 lb 3.2 oz)   12/24/21 71.8 kg (158 lb 4.6 oz)                    Breast Cancer Screening:    FHS-7: No flowsheet data found.    Patient under 40 years of age: Routine Mammogram Screening not recommended.   Pertinent mammograms are reviewed under the imaging tab.    History of abnormal Pap smear: NO - age 21-29 PAP every 3 years recommended  PAP / HPV Latest Ref Rng & Units 12/10/2021   PAP   Negative for Intraepithelial Lesion or Malignancy (NILM)     Reviewed and updated as needed this visit by clinical staff   Tobacco  Allergies  Meds              Reviewed and updated as needed this visit by Provider                     Review of Systems   Constitutional: Negative for chills and fever.   HENT: Negative for congestion, ear pain, hearing loss and sore throat.    Eyes: Negative for pain and visual disturbance.   Respiratory: Negative for cough and shortness of breath.    Cardiovascular: Negative for chest pain, palpitations and peripheral edema.   Gastrointestinal: Positive for constipation. Negative for abdominal pain, diarrhea, heartburn, hematochezia and nausea.   Breasts:  Negative for tenderness, breast mass and discharge.   Genitourinary: Negative for dysuria, frequency, genital sores, hematuria, pelvic pain, urgency, vaginal bleeding and vaginal discharge.   Musculoskeletal: Negative for arthralgias, joint swelling and myalgias.   Skin: Negative for rash.   Neurological: Negative for dizziness, weakness, headaches and paresthesias.   Psychiatric/Behavioral: Negative for mood changes. The patient is not nervous/anxious.           OBJECTIVE:   /76 (BP Location: Right arm, Patient Position: Sitting, Cuff Size: Adult Regular)   Pulse 70   Temp 98.3  F (36.8  C) (Tympanic)   Ht 1.74 m (5' 8.5\")   Wt 75.4 kg (166 lb 3.2 oz)   SpO2 99%   BMI 24.90 kg/m    Physical " Exam  GENERAL: healthy, alert and no distress  EYES: Eyes grossly normal to inspection, PERRL and conjunctivae and sclerae normal  HENT: ear canals and TM's normal, nose and mouth without ulcers or lesions  NECK: no adenopathy, no asymmetry, masses, or scars and thyroid normal to palpation  RESP: lungs clear to auscultation - no rales, rhonchi or wheezes  CV: regular rate and rhythm, normal S1 S2, no S3 or S4, no murmur, click or rub, no peripheral edema and peripheral pulses strong  ABDOMEN: soft, nontender, no hepatosplenomegaly, no masses and bowel sounds normal  MS: no gross musculoskeletal defects noted, no edema  SKIN: no suspicious lesions or rashes  NEURO: Normal strength and tone, mentation intact and speech normal  PSYCH: mentation appears normal, affect normal/bright        ASSESSMENT/PLAN:   1. Routine general medical examination at a health care facility  Well adult. No concerns.     2. Acne vulgaris  Patient interested in increasing spironolactone dose. Would suggest we eval potassium levels prior to increasing.   - Basic metabolic panel  (Ca, Cl, CO2, Creat, Gluc, K, Na, BUN); Future  - Basic metabolic panel  (Ca, Cl, CO2, Creat, Gluc, K, Na, BUN)     Patient has been advised of split billing requirements and indicates understanding: Yes      COUNSELING:  Special attention given to:        Regular exercise       Healthy diet/nutrition        She reports that she has never smoked. She has never used smokeless tobacco.          Alma Delia Rodriguez PA-C  [facility]

## 2023-03-10 DIAGNOSIS — L70.0 ACNE VULGARIS: Primary | ICD-10-CM

## 2023-03-10 RX ORDER — SPIRONOLACTONE 100 MG/1
150 TABLET, FILM COATED ORAL DAILY
Qty: 135 TABLET | Refills: 3 | Status: SHIPPED | OUTPATIENT
Start: 2023-03-10 | End: 2024-02-22

## 2023-07-12 ENCOUNTER — E-VISIT (OUTPATIENT)
Dept: FAMILY MEDICINE | Facility: CLINIC | Age: 28
End: 2023-07-12
Payer: COMMERCIAL

## 2023-07-12 DIAGNOSIS — L23.9 ALLERGIC CONTACT DERMATITIS, UNSPECIFIED TRIGGER: Primary | ICD-10-CM

## 2023-07-12 PROCEDURE — 99421 OL DIG E/M SVC 5-10 MIN: CPT | Performed by: PHYSICIAN ASSISTANT

## 2023-07-13 RX ORDER — PREDNISONE 20 MG/1
40 TABLET ORAL DAILY
Qty: 10 TABLET | Refills: 0 | Status: SHIPPED | OUTPATIENT
Start: 2023-07-13 | End: 2023-07-18

## 2023-07-18 NOTE — LETTER
"1/20/2022       RE: Rand Alvarado  [address] [address]     Dear Colleague,    Thank you for referring your patient, Rand Alvarado, to the [facility] [place] [facility]. Please see a copy of my visit note below.    [facility]    CC: Postop visit    S: 26 year old P0 here for postop visit from hysteroscopy, IUD removal and replacement. No concerns since procedure. Denies any pain, bleeding has resolved. Happy with IUD. Wonders about follow up for ovarian cyst.     O: /84 (BP Location: Left arm, Patient Position: Chair)   Pulse 88   Ht 1.753 m (5' 9\")   Wt 72.2 kg (159 lb 3.2 oz)   BMI 23.51 kg/m    General: No distress  Abdomen: Soft, non-tender, non-distended, no masses  Pelvic Exam:  Vulva: No external lesions, normal hair distribution, normal architecture  Vagina: Moist, pink, no abnormal discharge, well rugated, no lesions  Cervix: smooth, pink, no visible lesions- IUD strings visualized.   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

## 2023-08-14 ENCOUNTER — OFFICE VISIT (OUTPATIENT)
Dept: FAMILY MEDICINE | Facility: CLINIC | Age: 28
End: 2023-08-14
Payer: COMMERCIAL

## 2023-08-14 VITALS
HEART RATE: 76 BPM | OXYGEN SATURATION: 99 % | DIASTOLIC BLOOD PRESSURE: 73 MMHG | BODY MASS INDEX: 24.88 KG/M2 | SYSTOLIC BLOOD PRESSURE: 119 MMHG | RESPIRATION RATE: 16 BRPM | WEIGHT: 168 LBS | HEIGHT: 69 IN | TEMPERATURE: 98.6 F

## 2023-08-14 DIAGNOSIS — Z11.1 SCREENING EXAMINATION FOR PULMONARY TUBERCULOSIS: ICD-10-CM

## 2023-08-14 DIAGNOSIS — Z71.84 TRAVEL ADVICE ENCOUNTER: Primary | ICD-10-CM

## 2023-08-14 PROCEDURE — 90691 TYPHOID VACCINE IM: CPT | Mod: GA | Performed by: NURSE PRACTITIONER

## 2023-08-14 PROCEDURE — 99402 PREV MED CNSL INDIV APPRX 30: CPT | Mod: 25 | Performed by: NURSE PRACTITIONER

## 2023-08-14 PROCEDURE — 90471 IMMUNIZATION ADMIN: CPT | Mod: GA | Performed by: NURSE PRACTITIONER

## 2023-08-14 PROCEDURE — 36415 COLL VENOUS BLD VENIPUNCTURE: CPT | Mod: GA | Performed by: NURSE PRACTITIONER

## 2023-08-14 PROCEDURE — 86481 TB AG RESPONSE T-CELL SUSP: CPT | Mod: GA | Performed by: NURSE PRACTITIONER

## 2023-08-14 RX ORDER — AZITHROMYCIN 500 MG/1
500 TABLET, FILM COATED ORAL DAILY
Qty: 3 TABLET | Refills: 0 | Status: SHIPPED | OUTPATIENT
Start: 2023-08-14 | End: 2023-08-17

## 2023-08-14 ASSESSMENT — PAIN SCALES - GENERAL: PAINLEVEL: NO PAIN (0)

## 2023-08-14 NOTE — NURSING NOTE
Prior to immunization administration, verified patients identity using patient s name and date of birth. Please see Immunization Activity for additional information.     Screening Questionnaire for Adult Immunization    Are you sick today?   No   Do you have allergies to medications, food, a vaccine component or latex?   No   Have you ever had a serious reaction after receiving a vaccination?   No   Do you have a long-term health problem with heart, lung, kidney, or metabolic disease (e.g., diabetes), asthma, a blood disorder, no spleen, complement component deficiency, a cochlear implant, or a spinal fluid leak?  Are you on long-term aspirin therapy?   No   Do you have cancer, leukemia, HIV/AIDS, or any other immune system problem?   No   Do you have a parent, brother, or sister with an immune system problem?   No   In the past 3 months, have you taken medications that affect  your immune system, such as prednisone, other steroids, or anticancer drugs; drugs for the treatment of rheumatoid arthritis, Crohn s disease, or psoriasis; or have you had radiation treatments?   No   Have you had a seizure, or a brain or other nervous system problem?   No   During the past year, have you received a transfusion of blood or blood    products, or been given immune (gamma) globulin or antiviral drug?   No   For women: Are you pregnant or is there a chance you could become       pregnant during the next month?   No   Have you received any vaccinations in the past 4 weeks?   No     Immunization questionnaire answers were all negative.      Patient instructed to remain in clinic for 15 minutes afterwards, and to report any adverse reactions.     Screening performed by Vanessa Lundberg on 8/14/2023 at 4:50 PM.

## 2023-08-14 NOTE — PROGRESS NOTES
"Nurse Note ( Pre-Travel Consult)    Itinerary:  [place]     Departure Date: 9/16/2023    Return Date: 9/23/2023    Length of Trip. Pressure     Reason for Travel: Medical [place]         Urban or rural: rural    Accommodations: Volunteer/Missionary accommodations         IMMUNIZATION HISTORY  Have you received any immunizations within the past 4 weeks?  No  Have you ever fainted from having your blood drawn or from an injection?  No  Have you ever had a fever reaction to vaccination?  No  Have you ever had any bad reaction or side effect from any vaccination?  No  Have you ever had hepatitis A or B vaccine?  Yes  Do you live (or work closely) with anyone who has AIDS, an AIDS-like condition, any other immune disorder or who is on chemotherapy for cancer?  Yes  Do you have a family history of immunodeficiency?  No  Have you received any injection of immune globulin or any blood products during the past 12 months?  No    Patient roomed by Letty Alvarado is a 28 year old female seen today alone for counsultation for international travel.   Patient will be departing in  1 month(s) and  traveling [facility] .      Patient itinerary :  will be in the urban region [facility] about 45 minutes outside of the city  which risk for Dengue Fever, food borne illnesses, motor vehicle accidents, and Typhoid. exposure.      Patient's activities will include volunteer work and hospital/medical .    Patient's country of birth is [place]    Special medical concerns: none  Pre-travel questionnaire was completed by patient and reviewed by provider.     Vitals: /73 (BP Location: Right arm, Patient Position: Sitting, Cuff Size: Adult Regular)   Pulse 76   Temp 98.6  F (37  C) (Tympanic)   Resp 16   Ht 1.74 m (5' 8.5\")   Wt 76.2 kg (168 lb)   SpO2 99%   BMI 25.17 kg/m    BMI= Body mass index is 25.17 kg/m .    EXAM:  General:  Well-nourished, well-developed in no acute distress.  Appears to " be stated age, interacts appropriately and expresses understanding of information given to patient.    Current Outpatient Medications   Medication Sig Dispense Refill    levonorgestrel (MIRENA) 20 MCG/24HR IUD 1 each by Intrauterine route once      spironolactone (ALDACTONE) 100 MG tablet Take 1.5 tablets (150 mg) by mouth daily for 360 days 135 tablet 3     Patient Active Problem List   Diagnosis    Mild intermittent asthma, unspecified whether complicated    Seborrheic dermatitis    Eczema, unspecified type    Left leg paresthesias    Pain of left calf    Encounter for preventive health examination    Screen for STD (sexually transmitted disease)    Uterine cramping    Complication of intrauterine device (IUD), unspecified complication, initial encounter (H)    Uterine pain     Allergies   Allergen Reactions    Drug Ingredient [Nickel] Blisters    No Clinical Screening - See Comments Itching and Shortness Of Breath     cats    Propylene Glycol Rash         Immunizations discussed include:   Covid 19: Up to date  Hepatitis A:  Up to date  Hepatitis B: Up to date  Influenza: vaccine is not available  Typhoid: Ordered/given today, risks, benefits and side effects reviewed  Rabies: Declined  reviewed managment of a animal bite or scratch (washing wound, seek medical care within 24 hours for post exposure prophylaxis )  Yellow Fever: Not indicated  Japanese Encephalitis: Not indicated  Meningococcus: Not indicated  Tetanus/Diphtheria: Up to date  Measles/Mumps/Rubella: Up to date  Cholera: Not needed  Polio: Up to date  Pneumococcal: Under age of 65  Varicella: Up to date  Shingrix: Not indicated  HPV:  Up to date     TB: low risk     Altitude Exposure on this trip: no  Past tolerance to Altitude: na    ASSESSMENT/PLAN:  Rand was seen today for travel clinic.    Diagnoses and all orders for this visit:    Travel advice encounter  -     azithromycin (ZITHROMAX) 500 MG tablet; Take 1 tablet (500 mg) by mouth daily for  3 doses Take 1 tablet a day for up to 3 days for severe diarrhea    Other orders  -     TYPHOID VACCINE, IM      I have reviewed general recommendations for safe travel   including: food/water precautions, insect precautions, safer sex   practices given high prevalence of Zika, HIV and other STDs,   roadway safety. Educational materials and Travax report provided.    Malaraia prophylaxis recommended: none  Symptomatic treatment for traveler's diarrhea: azithromycin        Evacuation insurance advised and resources were provided to patient.    Total visit time 30 minutes  with over 50% of time spent counseling patient and shared decision making as detailed above.    Laina Neff CNP  Certificate in Travel Health

## 2023-08-16 LAB
GAMMA INTERFERON BACKGROUND BLD IA-ACNC: 0.07 IU/ML
M TB IFN-G BLD-IMP: NEGATIVE
M TB IFN-G CD4+ BCKGRND COR BLD-ACNC: 9.93 IU/ML
MITOGEN IGNF BCKGRD COR BLD-ACNC: 0.01 IU/ML
MITOGEN IGNF BCKGRD COR BLD-ACNC: 0.05 IU/ML
QUANTIFERON MITOGEN: 10 IU/ML
QUANTIFERON NIL TUBE: 0.07 IU/ML
QUANTIFERON TB1 TUBE: 0.08 IU/ML
QUANTIFERON TB2 TUBE: 0.12

## 2024-01-29 ENCOUNTER — E-VISIT (OUTPATIENT)
Dept: FAMILY MEDICINE | Facility: CLINIC | Age: 29
End: 2024-01-29
Payer: COMMERCIAL

## 2024-01-29 DIAGNOSIS — B37.31 CANDIDAL VULVOVAGINITIS: Primary | ICD-10-CM

## 2024-01-29 PROCEDURE — 99421 OL DIG E/M SVC 5-10 MIN: CPT | Performed by: PHYSICIAN ASSISTANT

## 2024-01-29 RX ORDER — FLUCONAZOLE 150 MG/1
150 TABLET ORAL ONCE
Qty: 1 TABLET | Refills: 0 | Status: SHIPPED | OUTPATIENT
Start: 2024-01-29 | End: 2024-01-29

## 2024-02-15 SDOH — HEALTH STABILITY: PHYSICAL HEALTH: ON AVERAGE, HOW MANY DAYS PER WEEK DO YOU ENGAGE IN MODERATE TO STRENUOUS EXERCISE (LIKE A BRISK WALK)?: 2 DAYS

## 2024-02-15 SDOH — HEALTH STABILITY: PHYSICAL HEALTH: ON AVERAGE, HOW MANY MINUTES DO YOU ENGAGE IN EXERCISE AT THIS LEVEL?: 60 MIN

## 2024-02-15 ASSESSMENT — ASTHMA QUESTIONNAIRES
QUESTION_2 LAST FOUR WEEKS HOW OFTEN HAVE YOU HAD SHORTNESS OF BREATH: NOT AT ALL
ACT_TOTALSCORE: 25
QUESTION_3 LAST FOUR WEEKS HOW OFTEN DID YOUR ASTHMA SYMPTOMS (WHEEZING, COUGHING, SHORTNESS OF BREATH, CHEST TIGHTNESS OR PAIN) WAKE YOU UP AT NIGHT OR EARLIER THAN USUAL IN THE MORNING: NOT AT ALL
QUESTION_1 LAST FOUR WEEKS HOW MUCH OF THE TIME DID YOUR ASTHMA KEEP YOU FROM GETTING AS MUCH DONE AT WORK, SCHOOL OR AT HOME: NONE OF THE TIME
QUESTION_5 LAST FOUR WEEKS HOW WOULD YOU RATE YOUR ASTHMA CONTROL: COMPLETELY CONTROLLED
QUESTION_4 LAST FOUR WEEKS HOW OFTEN HAVE YOU USED YOUR RESCUE INHALER OR NEBULIZER MEDICATION (SUCH AS ALBUTEROL): NOT AT ALL
ACT_TOTALSCORE: 25

## 2024-02-15 ASSESSMENT — SOCIAL DETERMINANTS OF HEALTH (SDOH): HOW OFTEN DO YOU GET TOGETHER WITH FRIENDS OR RELATIVES?: MORE THAN THREE TIMES A WEEK

## 2024-02-22 ENCOUNTER — OFFICE VISIT (OUTPATIENT)
Dept: FAMILY MEDICINE | Facility: CLINIC | Age: 29
End: 2024-02-22
Payer: COMMERCIAL

## 2024-02-22 ENCOUNTER — MYC MEDICAL ADVICE (OUTPATIENT)
Dept: FAMILY MEDICINE | Facility: CLINIC | Age: 29
End: 2024-02-22

## 2024-02-22 VITALS
HEIGHT: 69 IN | DIASTOLIC BLOOD PRESSURE: 68 MMHG | WEIGHT: 169 LBS | OXYGEN SATURATION: 97 % | SYSTOLIC BLOOD PRESSURE: 106 MMHG | HEART RATE: 75 BPM | TEMPERATURE: 98.7 F | BODY MASS INDEX: 25.03 KG/M2

## 2024-02-22 DIAGNOSIS — R19.5 CHANGE IN STOOL CALIBER: ICD-10-CM

## 2024-02-22 DIAGNOSIS — R41.840 ATTENTION DEFICIT: ICD-10-CM

## 2024-02-22 DIAGNOSIS — L70.0 ACNE VULGARIS: ICD-10-CM

## 2024-02-22 DIAGNOSIS — Z00.00 ROUTINE GENERAL MEDICAL EXAMINATION AT A HEALTH CARE FACILITY: Primary | ICD-10-CM

## 2024-02-22 DIAGNOSIS — F41.1 GENERALIZED ANXIETY DISORDER: ICD-10-CM

## 2024-02-22 DIAGNOSIS — J45.20 MILD INTERMITTENT ASTHMA, UNSPECIFIED WHETHER COMPLICATED: ICD-10-CM

## 2024-02-22 DIAGNOSIS — F41.8 TEST ANXIETY: Primary | ICD-10-CM

## 2024-02-22 PROCEDURE — 91320 SARSCV2 VAC 30MCG TRS-SUC IM: CPT | Performed by: PHYSICIAN ASSISTANT

## 2024-02-22 PROCEDURE — 90471 IMMUNIZATION ADMIN: CPT | Performed by: PHYSICIAN ASSISTANT

## 2024-02-22 PROCEDURE — 90480 ADMN SARSCOV2 VAC 1/ONLY CMP: CPT | Performed by: PHYSICIAN ASSISTANT

## 2024-02-22 PROCEDURE — 90686 IIV4 VACC NO PRSV 0.5 ML IM: CPT | Performed by: PHYSICIAN ASSISTANT

## 2024-02-22 PROCEDURE — 99213 OFFICE O/P EST LOW 20 MIN: CPT | Mod: 25 | Performed by: PHYSICIAN ASSISTANT

## 2024-02-22 PROCEDURE — 99395 PREV VISIT EST AGE 18-39: CPT | Mod: 25 | Performed by: PHYSICIAN ASSISTANT

## 2024-02-22 RX ORDER — SPIRONOLACTONE 100 MG/1
150 TABLET, FILM COATED ORAL DAILY
Qty: 135 TABLET | Refills: 3 | Status: SHIPPED | OUTPATIENT
Start: 2024-02-22

## 2024-02-22 NOTE — PATIENT INSTRUCTIONS
Preventive Care Advice   This is general advice given by our system to help you stay healthy. However, your care team may have specific advice just for you. Please talk to your care team about your preventive care needs.  Nutrition  Eat 5 or more servings of fruits and vegetables each day.  Try wheat bread, brown rice and whole grain pasta (instead of white bread, rice, and pasta).  Get enough calcium and vitamin D. Check the label on foods and aim for 100% of the RDA (recommended daily allowance).  Lifestyle  Exercise at least 150 minutes each week  (30 minutes a day, 5 days a week).  Do muscle strengthening activities 2 days a week. These help control your weight and prevent disease.  No smoking.  Wear sunscreen to prevent skin cancer.  Have a dental exam and cleaning every 6 months.  Yearly exams  See your health care team every year to talk about:  Any changes in your health.  Any medicines your care team has prescribed.  Preventive care, family planning, and ways to prevent chronic diseases.  Shots (vaccines)   HPV shots (up to age 26), if you've never had them before.  Hepatitis B shots (up to age 59), if you've never had them before.  COVID-19 shot: Get this shot when it's due.  Flu shot: Get a flu shot every year.  Tetanus shot: Get a tetanus shot every 10 years.  Pneumococcal, hepatitis A, and RSV shots: Ask your care team if you need these based on your risk.  Shingles shot (for age 50 and up)  General health tests  Diabetes screening:  Starting at age 35, Get screened for diabetes at least every 3 years.  If you are younger than age 35, ask your care team if you should be screened for diabetes.  Cholesterol test: At age 39, start having a cholesterol test every 5 years, or more often if advised.  Bone density scan (DEXA): At age 50, ask your care team if you should have this scan for osteoporosis (brittle bones).  Hepatitis C: Get tested at least once in your life.  STIs (sexually transmitted  infections)  Before age 24: Ask your care team if you should be screened for STIs.  After age 24: Get screened for STIs if you're at risk. You are at risk for STIs (including HIV) if:  You are sexually active with more than one person.  You don't use condoms every time.  You or a partner was diagnosed with a sexually transmitted infection.  If you are at risk for HIV, ask about PrEP medicine to prevent HIV.  Get tested for HIV at least once in your life, whether you are at risk for HIV or not.  Cancer screening tests  Cervical cancer screening: If you have a cervix, begin getting regular cervical cancer screening tests starting at age 21.  Breast cancer scan (mammogram): If you've ever had breasts, begin having regular mammograms starting at age 40. This is a scan to check for breast cancer.  Colon cancer screening: It is important to start screening for colon cancer at age 45.  Have a colonoscopy test every 10 years (or more often if you're at risk) Or, ask your provider about stool tests like a FIT test every year or Cologuard test every 3 years.  To learn more about your testing options, visit:   https://www.ITao/439228.pdf.  For help making a decision, visit:   https://bit.ly/pp95014.  Prostate cancer screening test: If you have a prostate, ask your care team if a prostate cancer screening test (PSA) at age 55 is right for you.  Lung cancer screening: If you are a current or former smoker ages 50 to 80, ask your care team if ongoing lung cancer screenings are right for you.  For informational purposes only. Not to replace the advice of your health care provider. Copyright   2023 [facility]. All rights reserved. Clinically reviewed by the [facility] Transitions Program. [employer] 705061 - REV 01/24.    Learning About Stress  What is stress?     Stress is your body's response to a hard situation. Your body can have a physical, emotional, or mental response. Stress is a fact of life for  most people, and it affects everyone differently. What causes stress for you may not be stressful for someone else.  A lot of things can cause stress. You may feel stress when you go on a job interview, take a test, or run a race. This kind of short-term stress is normal and even useful. It can help you if you need to work hard or react quickly. For example, stress can help you finish an important job on time.  Long-term stress is caused by ongoing stressful situations or events. Examples of long-term stress include long-term health problems, ongoing problems at work, or conflicts in your family. Long-term stress can harm your health.  How does stress affect your health?  When you are stressed, your body responds as though you are in danger. It makes hormones that speed up your heart, make you breathe faster, and give you a burst of energy. This is called the fight-or-flight stress response. If the stress is over quickly, your body goes back to normal and no harm is done.  But if stress happens too often or lasts too long, it can have bad effects. Long-term stress can make you more likely to get sick, and it can make symptoms of some diseases worse. If you tense up when you are stressed, you may develop neck, shoulder, or low back pain. Stress is linked to high blood pressure and heart disease.  Stress also harms your emotional health. It can make you sharpe, tense, or depressed. Your relationships may suffer, and you may not do well at work or school.  What can you do to manage stress?  You can try these things to help manage stress:   Do something active. Exercise or activity can help reduce stress. Walking is a great way to get started. Even everyday activities such as housecleaning or yard work can help.  Try yoga or barrett chi. These techniques combine exercise and meditation. You may need some training at first to learn them.  Do something you enjoy. For example, listen to music or go to a movie. Practice your  "hobby or do volunteer work.  Meditate. This can help you relax, because you are not worrying about what happened before or what may happen in the future.  Do guided imagery. Imagine yourself in any setting that helps you feel calm. You can use online videos, books, or a teacher to guide you.  Do breathing exercises. For example:  From a standing position, bend forward from the waist with your knees slightly bent. Let your arms dangle close to the floor.  Breathe in slowly and deeply as you return to a standing position. Roll up slowly and lift your head last.  Hold your breath for just a few seconds in the standing position.  Breathe out slowly and bend forward from the waist.  Let your feelings out. Talk, laugh, cry, and express anger when you need to. Talking with supportive friends or family, a counselor, or a zeke leader about your feelings is a healthy way to relieve stress. Avoid discussing your feelings with people who make you feel worse.  Write. It may help to write about things that are bothering you. This helps you find out how much stress you feel and what is causing it. When you know this, you can find better ways to cope.  What can you do to prevent stress?  You might try some of these things to help prevent stress:  Manage your time. This helps you find time to do the things you want and need to do.  Get enough sleep. Your body recovers from the stresses of the day while you are sleeping.  Get support. Your family, friends, and community can make a difference in how you experience stress.  Limit your news feed. Avoid or limit time on social media or news that may make you feel stressed.  Do something active. Exercise or activity can help reduce stress. Walking is a great way to get started.  Where can you learn more?  Go to https://www.healthwise.net/patiented  Enter N032 in the search box to learn more about \"Learning About Stress.\"  Current as of: February 26, 2023               Content Version: " 13.8    3639-6315 [employer].   Care instructions adapted under license by your healthcare professional. If you have questions about a medical condition or this instruction, always ask your healthcare professional. [employer] disclaims any warranty or liability for your use of this information.      Safer Sex: Care Instructions  Overview  Safer sex is a way to reduce your risk of getting a sexually transmitted infection (STI). It can also help prevent pregnancy.  Several products can help you practice safer sex and reduce your chance of STIs. One of the best is a condom. There are internal and external condoms. You can use a special rubber sheet (dental dam) for protection during oral sex. Disposable gloves can keep your hands from touching blood, semen, or other body fluids that can carry infections.  Remember that birth control methods such as diaphragms, IUDs, foams, and birth control pills do not stop you from getting STIs.  Follow-up care is a key part of your treatment and safety. Be sure to make and go to all appointments, and call your doctor if you are having problems. It's also a good idea to know your test results and keep a list of the medicines you take.  How can you care for yourself at home?  Think about getting vaccinated to help prevent hepatitis A, hepatitis B, and human papillomavirus (HPV). They can be spread through sex.  Use a condom every time you have sex. Use an external condom, which goes on the penis. Or use an internal condom, which goes into the vagina or anus.  Make sure you use the right size external condom. A condom that's too small can break easily. A condom that's too big can slip off during sex.  Use a new condom each time you have sex. Be careful not to poke a hole in the condom when you open the wrapper.  Don't use an internal condom and an external condom at the same time.  Never use petroleum jelly (such as Vaseline), grease, hand lotion, baby oil,  "or anything with oil in it. These products can make holes in the condom.  After intercourse, hold the edge of the condom as you remove it. This will help keep semen from spilling out of the condom.  Do not have sex with anyone who has symptoms of an STI, such as sores on the genitals or mouth.  Do not drink a lot of alcohol or use drugs before sex.  Limit your sex partners. Sex with one partner who has sex only with you can reduce your risk of getting an STI.  Don't share sex toys. But if you do share them, use a condom and clean the sex toys between each use.  Talk to your partner(s) before you have sex. Talk about what you feel comfortable with and whether you have any boundaries with sex. And find out if your partner(s) may be at risk for any STI. Keep in mind that a person may be able to spread an STI even if they do not have symptoms. You and your partner(s) may want to get tested for STIs.  Where can you learn more?  Go to https://www.ChaoWIFI.net/patiented  Enter B608 in the search box to learn more about \"Safer Sex: Care Instructions.\"  Current as of: April 19, 2023               Content Version: 13.8    1464-2288 [employer].   Care instructions adapted under license by your healthcare professional. If you have questions about a medical condition or this instruction, always ask your healthcare professional. [employer] disclaims any warranty or liability for your use of this information.      "

## 2024-02-22 NOTE — PROGRESS NOTES
"Preventive Care Visit  [facility] ALMITA Rodriguez PA-C, Family Medicine  Feb 22, 2024    Assessment & Plan     Routine general medical examination at a health care facility  Well adult.     Acne vulgaris  Refilled.  - spironolactone (ALDACTONE) 100 MG tablet; Take 1.5 tablets (150 mg) by mouth daily    Mild intermittent asthma, unspecified whether complicated  Stable. ACT is 25.     Attention deficit  Patient interested in ADHD testing. Has difficulty focusing.   - Adult Mental Health  Referral; Future    Change in stool caliber  Issues with constipation, mucous in stool. Suggest colonoscopy.  - Adult GI  Referral - Procedure Only; Future              BMI  Estimated body mass index is 25.26 kg/m  as calculated from the following:    Height as of this encounter: 1.742 m (5' 8.58\").    Weight as of this encounter: 76.7 kg (169 lb).   Weight management plan: Discussed healthy diet and exercise guidelines    Counseling  Appropriate preventive services were discussed with this patient, including applicable screening as appropriate for fall prevention, nutrition, physical activity, Tobacco-use cessation, weight loss and cognition.  Checklist reviewing preventive services available has been given to the patient.  Reviewed patient's diet, addressing concerns and/or questions.   She is at risk for lack of exercise and has been provided with information to increase physical activity for the benefit of her well-being.   The patient was instructed to see the dentist every 6 months.   She is at risk for psychosocial distress and has been provided with information to reduce risk.           Eri Gordillo is a 28 year old, presenting for the following:  Physical and Forms (Medical History adn Physical Exam Form for Physical Assistant dasha Program)        2/22/2024     2:13 PM   Additional Questions   Roomed by An CAILIN.   Failed to redirect to the Timeline version of the REVFS " SmartLink.      2/22/2024     2:13 PM   Patient Reported Additional Medications   Patient reports taking the following new medications none        Health Care Directive  Patient does not have a Health Care Directive or Living Will: Discussed advance care planning with patient; however, patient declined at this time.    HPI    Going to [employer]. Starts in July.         11/16/2020     2:40 PM 3/2/2023    10:04 AM 2/15/2024    10:37 AM   ACT Total Scores   ACT TOTAL SCORE (Goal Greater than or Equal to 20) 25 25 25   In the past 12 months, how many times did you visit the emergency room for your asthma without being admitted to the hospital? 0 0 0   In the past 12 months, how many times were you hospitalized overnight because of your asthma? 0 0 0                   2/15/2024   General Health   How would you rate your overall physical health? Good   Feel stress (tense, anxious, or unable to sleep) Only a little   (!) STRESS CONCERN      2/15/2024   Nutrition   Three or more servings of calcium each day? (!) I DON'T KNOW   Diet: Breakfast skipped   How many servings of fruit and vegetables per day? (!) 2-3   How many sweetened beverages each day? 0-1         2/15/2024   Exercise   Days per week of moderate/strenous exercise 2 days   Average minutes spent exercising at this level 60 min   (!) EXERCISE CONCERN      2/15/2024   Social Factors   Frequency of gathering with friends or relatives More than three times a week   Worry food won't last until get money to buy more No   Food not last or not have enough money for food? No   Do you have housing?  Yes   Are you worried about losing your housing? No   Lack of transportation? No   Unable to get utilities (heat,electricity)? No         2/15/2024   Dental   Dentist two times every year? (!) NO            Today's PHQ-2 Score:       2/21/2024     3:10 PM   PHQ-2 ( 1999 Pfizer)   Q1: Little interest or pleasure in doing things 0   Q2: Feeling down,  "depressed or hopeless 0   PHQ-2 Score 0   Q1: Little interest or pleasure in doing things Not at all   Q2: Feeling down, depressed or hopeless Not at all   PHQ-2 Score 0           2/15/2024   Substance Use   Alcohol more than 3/day or more than 7/wk No   Do you use any other substances recreationally? No     Social History     Tobacco Use    Smoking status: Never    Smokeless tobacco: Never   Vaping Use    Vaping Use: Never used   Substance Use Topics    Alcohol use: Yes     Comment: socially     Drug use: Never                  2/15/2024   STI Screening   New sexual partner(s) since last STI/HIV test? (!) YES      History of abnormal Pap smear: NO - age 30-65 PAP every 5 years with negative HPV co-testing recommended        12/10/2021     4:29 PM   PAP / HPV   PAP Negative for Intraepithelial Lesion or Malignancy (NILM)            2/15/2024   Contraception/Family Planning   Questions about contraception or family planning No        Reviewed and updated as needed this visit by Provider                          Review of Systems  Constitutional, HEENT, cardiovascular, pulmonary, gi and gu systems are negative, except as otherwise noted.     Objective    Exam  /68   Pulse 75   Temp 98.7  F (37.1  C) (Oral)   Ht 1.742 m (5' 8.58\")   Wt 76.7 kg (169 lb)   SpO2 97%   BMI 25.26 kg/m     Estimated body mass index is 25.26 kg/m  as calculated from the following:    Height as of this encounter: 1.742 m (5' 8.58\").    Weight as of this encounter: 76.7 kg (169 lb).    Physical Exam  GENERAL: alert and no distress  RESP: lungs clear to auscultation - no rales, rhonchi or wheezes  CV: regular rate and rhythm, normal S1 S2, no S3 or S4, no murmur, click or rub, no peripheral edema  ABDOMEN: soft, nontender, no hepatosplenomegaly, no masses and bowel sounds normal  MS: no gross musculoskeletal defects noted, no edema  SKIN: no suspicious lesions or rashes  NEURO: Normal strength and tone, mentation intact and speech " normal  PSYCH: mentation appears normal, affect normal/bright      Signed Electronically by: Alma Delia Rodriguez PA-C

## 2024-03-18 ENCOUNTER — TELEPHONE (OUTPATIENT)
Dept: OBGYN | Facility: CLINIC | Age: 29
End: 2024-03-18
Payer: COMMERCIAL

## 2024-05-08 ASSESSMENT — ANXIETY QUESTIONNAIRES
5. BEING SO RESTLESS THAT IT IS HARD TO SIT STILL: NOT AT ALL
7. FEELING AFRAID AS IF SOMETHING AWFUL MIGHT HAPPEN: NOT AT ALL
6. BECOMING EASILY ANNOYED OR IRRITABLE: NOT AT ALL
1. FEELING NERVOUS, ANXIOUS, OR ON EDGE: SEVERAL DAYS
IF YOU CHECKED OFF ANY PROBLEMS ON THIS QUESTIONNAIRE, HOW DIFFICULT HAVE THESE PROBLEMS MADE IT FOR YOU TO DO YOUR WORK, TAKE CARE OF THINGS AT HOME, OR GET ALONG WITH OTHER PEOPLE: NOT DIFFICULT AT ALL
GAD7 TOTAL SCORE: 1
2. NOT BEING ABLE TO STOP OR CONTROL WORRYING: NOT AT ALL
4. TROUBLE RELAXING: NOT AT ALL
3. WORRYING TOO MUCH ABOUT DIFFERENT THINGS: NOT AT ALL
8. IF YOU CHECKED OFF ANY PROBLEMS, HOW DIFFICULT HAVE THESE MADE IT FOR YOU TO DO YOUR WORK, TAKE CARE OF THINGS AT HOME, OR GET ALONG WITH OTHER PEOPLE?: NOT DIFFICULT AT ALL
7. FEELING AFRAID AS IF SOMETHING AWFUL MIGHT HAPPEN: NOT AT ALL
GAD7 TOTAL SCORE: 1

## 2024-05-09 ENCOUNTER — OFFICE VISIT (OUTPATIENT)
Dept: OBGYN | Facility: CLINIC | Age: 29
End: 2024-05-09
Attending: OBSTETRICS & GYNECOLOGY
Payer: COMMERCIAL

## 2024-05-09 VITALS
HEART RATE: 86 BPM | HEIGHT: 69 IN | WEIGHT: 169 LBS | BODY MASS INDEX: 25.03 KG/M2 | DIASTOLIC BLOOD PRESSURE: 86 MMHG | SYSTOLIC BLOOD PRESSURE: 124 MMHG

## 2024-05-09 DIAGNOSIS — Z11.3 SCREENING EXAMINATION FOR STI: ICD-10-CM

## 2024-05-09 DIAGNOSIS — Z30.431 IUD CHECK UP: ICD-10-CM

## 2024-05-09 DIAGNOSIS — Z12.4 SCREENING FOR CERVICAL CANCER: ICD-10-CM

## 2024-05-09 DIAGNOSIS — Z00.00 PREVENTATIVE HEALTH CARE: Primary | ICD-10-CM

## 2024-05-09 DIAGNOSIS — N92.6 IRREGULAR MENSTRUAL BLEEDING: ICD-10-CM

## 2024-05-09 PROCEDURE — 99213 OFFICE O/P EST LOW 20 MIN: CPT | Mod: 25 | Performed by: OBSTETRICS & GYNECOLOGY

## 2024-05-09 PROCEDURE — 99395 PREV VISIT EST AGE 18-39: CPT | Mod: GC | Performed by: OBSTETRICS & GYNECOLOGY

## 2024-05-09 PROCEDURE — G0145 SCR C/V CYTO,THINLAYER,RESCR: HCPCS | Performed by: OBSTETRICS & GYNECOLOGY

## 2024-05-09 PROCEDURE — 87491 CHLMYD TRACH DNA AMP PROBE: CPT | Performed by: OBSTETRICS & GYNECOLOGY

## 2024-05-09 NOTE — LETTER
2024       RE: Rand Alvarado  [address] [facility] [address]     Dear Colleague,    Thank you for referring your patient, Rand Alvarado, to the [facility] [place] [facility]. Please see a copy of my visit note below.    SUBJECTIVE: 29 year old   female here for annual routine pap and checkup.     Pt reports that for the last 8 months she has been having monthly periods which she did not have previously since Mirena was replaced in . At the end of March she experienced 10 days of severe pelvic pain and heavy vaginal bleeding with associated nausea. Bleeding lasted about 2 weeks with lots of thin/watery discharge. She was wondering if she might have burst a cyst. Also noted that her symptoms felt similar to when her prior IUD required replacement in ; was found to be encased in endometrial tissue at the time. Since the 10 days of bleeding, she has not had a regular period. Has had intermittent spotting and mild intermittent pelvic pain, not severe. She denies any breast symptoms, does self exams at home. Hasn't been able to feel her IUD strings since placement.    Will start grad school in [place] for [place] soon; would like to do her pap today.    Answers submitted by the patient for this visit:  LIS-7 (Submitted on 2024)  LIS 7 TOTAL SCORE: 1    Gyn Hx:  Patient's last menstrual period was 2024 (exact date).  Menses:  monthly for the last 8 months then irregular for the last 2 months  Contraception: IUD mirena placed 2021 under anesthesia  Sexual Activity: active with 1 partner, 2 in the last year  Sexually transmitted disease history: Hx chlamydia freshman in college; none since then. Would like repeat testing today  PAP smear history: 2021, would like to do one today  Last mammogram: None prior      Past Medical History:   Diagnosis Date    Chlamydia     Motion sickness     PONV  (postoperative nausea and vomiting)     Uncomplicated asthma     only with illness, rare    Urinary tract infection     5/2021    Wounds and injuries 2020    PTSD, work related.      Past Surgical History:   Procedure Laterality Date    DILATION AND CURETTAGE, HYSTEROSCOPY DIAGNOSTIC, COMBINED N/A 12/24/2021    Procedure: HYSTEROSCOPY, DIAGNOSTIC, WITH DILATION AND CURETTAGE OF UTERUS,;  Surgeon: Linn Melchor MD;  Location: [facility] OR    HC TOOTH EXTRACTION W/FORCEP  2015    INSERT INTRAUTERINE DEVICE N/A 12/24/2021    Procedure: insert intrauterine device;  Surgeon: Linn Melchor MD;  Location: [facility] OR    KNEE SURGERY Bilateral 2010    KNEE SURGERY Bilateral 2012    ORTHOPEDIC SURGERY  2012&2014    REMOVE INTRAUTERINE DEVICE N/A 12/24/2021    Procedure: REMOVAL, INTRAUTERINE DEVICE;  Surgeon: Linn Melchor MD;  Location: [facility] OR     Family History   Problem Relation Age of Onset    Anesthesia Reaction Mother     Lupus Sister     Breast Cancer Paternal Grandmother         60's    Diabetes Maternal Grandmother     Thyroid Disease Maternal Grandmother     Diabetes Maternal Grandfather     Anesthesia Reaction Sister     Endometriosis Maternal Aunt     Endometriosis Maternal Aunt     Endometriosis Paternal Aunt      Social History     Socioeconomic History    Marital status: Single     Spouse name: Not on file    Number of children: Not on file    Years of education: Not on file    Highest education level: Not on file   Occupational History    Not on file   Tobacco Use    Smoking status: Never    Smokeless tobacco: Never   Vaping Use    Vaping status: Never Used   Substance and Sexual Activity    Alcohol use: Yes     Comment: socially     Drug use: Never    Sexual activity: Yes     Partners: Male     Birth control/protection: I.U.D.   Other Topics Concern    Parent/sibling w/ CABG, MI or angioplasty before 65F 55M? No   Social History Narrative    Not on file     Social Determinants of Health      Financial Resource Strain: Low Risk  (2/15/2024)    Financial Resource Strain     Within the past 12 months, have you or your family members you live with been unable to get utilities (heat, electricity) when it was really needed?: No   Food Insecurity: Low Risk  (2/15/2024)    Food Insecurity     Within the past 12 months, did you worry that your food would run out before you got money to buy more?: No     Within the past 12 months, did the food you bought just not last and you didn t have money to get more?: No   Transportation Needs: Low Risk  (2/15/2024)    Transportation Needs     Within the past 12 months, has lack of transportation kept you from medical appointments, getting your medicines, non-medical meetings or appointments, work, or from getting things that you need?: No   Physical Activity: Insufficiently Active (2/15/2024)    Exercise Vital Sign     Days of Exercise per Week: 2 days     Minutes of Exercise per Session: 60 min   Stress: No Stress Concern Present (2/15/2024)    [employer] - Occupational Stress Questionnaire     Feeling of Stress : Only a little   Social Connections: Unknown (2/15/2024)    Social Connection and Isolation Panel [NHANES]     Frequency of Communication with Friends and Family: Not on file     Frequency of Social Gatherings with Friends and Family: More than three times a week     Attends Adventism Services: Not on file     Active Member of Clubs or Organizations: Not on file     Attends Club or Organization Meetings: Not on file     Marital Status: Not on file   Interpersonal Safety: Low Risk  (2/22/2024)    Interpersonal Safety     Do you feel physically and emotionally safe where you currently live?: Yes     Within the past 12 months, have you been hit, slapped, kicked or otherwise physically hurt by someone?: No     Within the past 12 months, have you been humiliated or emotionally abused in other ways by your partner or ex-partner?: No  "  Housing Stability: Low Risk  (2/15/2024)    Housing Stability     Do you have housing? : Yes     Are you worried about losing your housing?: No       Drug ingredient [nickel], No clinical screening - see comments, and Propylene glycol  Current Outpatient Medications   Medication Sig Dispense Refill    levonorgestrel (MIRENA) 20 MCG/24HR IUD 1 each by Intrauterine route once      spironolactone (ALDACTONE) 100 MG tablet Take 1.5 tablets (150 mg) by mouth daily 135 tablet 3       OBJECTIVE:    Exam:  /86   Pulse 86   Ht 1.76 m (5' 9.29\")   Wt 76.7 kg (169 lb)   LMP 2024 (Exact Date)   BMI 24.75 kg/m    Constitutional: healthy, alert, and no distress  Neck: Neck supple. No adenopathy. Thyroid symmetric, normal size,, Carotids without bruits.  Cardiovascular: negative, PMI normal. No lifts, heaves, or thrills. RRR. No murmurs, clicks gallops or rub  Respiratory: negative, Percussion normal. Good diaphragmatic excursion. Lungs clear  Breast Exam: No visible masses or suspicious skin changes noted., No discrete or dominant masses noted to palpation., and No axillary adenopathy noted..  Gastrointestinal: Abdomen soft, non-tender. BS normal. No masses, organomegaly  Pelvic Exam - External genitalia and vagina normal. Bimanual and rectovaginal normal., negative findings:  external genitalia normal, no abnormal discharge, vaginal mucosa normal, cervix- no lesions, no cervical motion tenderness, IUD strings not seen, adnexae- no masses, non-tender, no pelvic or rectal masses, normal sized uterus  Skin: no suspicious lesions or rashes  Psychiatric: mentation appears normal and affect normal/bright      ASSESSMENT: 29 year old  female here for annual routine pap and IUD checkup. Noting irregular bleeding and pelvic pain for the last couple of months.    PLAN:   - Breast and pelvic exam done today  - Unable to visualize IUD strings; Transvaginal ultrasound ordered. Recommend back up contraception until " IUD location confirmed. Discussed alternative forms of contraception if continued issues with Mirena including nexplanon.   - Cervical cancer screening: Pap completed today  - POCT urine pregnancy test is negative today  - GC chlamydia swab sent    Staffed with Jayleen Villar  PGY-3 [facility] Internal Medicine    Appreciate note by Dr. Morris. Patient has been seen and examined by me with the resident, agree with above note.     Linn Melchor MD

## 2024-05-09 NOTE — PROGRESS NOTES
SUBJECTIVE: 29 year old   female here for annual routine pap and checkup.     Pt reports that for the last 8 months she has been having monthly periods which she did not have previously since Mirena was replaced in . At the end of March she experienced 10 days of severe pelvic pain and heavy vaginal bleeding with associated nausea. Bleeding lasted about 2 weeks with lots of thin/watery discharge. She was wondering if she might have burst a cyst. Also noted that her symptoms felt similar to when her prior IUD required replacement in ; was found to be encased in endometrial tissue at the time. Since the 10 days of bleeding, she has not had a regular period. Has had intermittent spotting and mild intermittent pelvic pain, not severe. She denies any breast symptoms, does self exams at home. Hasn't been able to feel her IUD strings since placement.    Will start grad school in [place] for [place] soon; would like to do her pap today.    Answers submitted by the patient for this visit:  LIS-7 (Submitted on 2024)  LIS 7 TOTAL SCORE: 1    Gyn Hx:  Patient's last menstrual period was 2024 (exact date).  Menses:  monthly for the last 8 months then irregular for the last 2 months  Contraception: IUD mirena placed 2021 under anesthesia  Sexual Activity: active with 1 partner, 2 in the last year  Sexually transmitted disease history: Hx chlamydia freshman in college; none since then. Would like repeat testing today  PAP smear history: 2021, would like to do one today  Last mammogram: None prior      Past Medical History:   Diagnosis Date    Chlamydia 2016    Motion sickness     PONV (postoperative nausea and vomiting)     Uncomplicated asthma     only with illness, rare    Urinary tract infection     2021    Wounds and injuries 2020    PTSD, work related.      Past Surgical History:   Procedure Laterality Date    DILATION AND CURETTAGE, HYSTEROSCOPY DIAGNOSTIC, COMBINED N/A 2021     Procedure: HYSTEROSCOPY, DIAGNOSTIC, WITH DILATION AND CURETTAGE OF UTERUS,;  Surgeon: Linn Melchor MD;  [facility]    HC TOOTH EXTRACTION W/FORCEP  2015    INSERT INTRAUTERINE DEVICE N/A 12/24/2021    Procedure: insert intrauterine device;  Surgeon: Linn Melchor MD;  [facility]    KNEE SURGERY Bilateral 2010    KNEE SURGERY Bilateral 2012    ORTHOPEDIC SURGERY  2012&2014    REMOVE INTRAUTERINE DEVICE N/A 12/24/2021    Procedure: REMOVAL, INTRAUTERINE DEVICE;  Surgeon: Linn Melchor MD;  [facility]     Family History   Problem Relation Age of Onset    Anesthesia Reaction Mother     Lupus Sister     Breast Cancer Paternal Grandmother         60's    Diabetes Maternal Grandmother     Thyroid Disease Maternal Grandmother     Diabetes Maternal Grandfather     Anesthesia Reaction Sister     Endometriosis Maternal Aunt     Endometriosis Maternal Aunt     Endometriosis Paternal Aunt      Social History     Socioeconomic History    Marital status: Single     Spouse name: Not on file    Number of children: Not on file    Years of education: Not on file    Highest education level: Not on file   Occupational History    Not on file   Tobacco Use    Smoking status: Never    Smokeless tobacco: Never   Vaping Use    Vaping status: Never Used   Substance and Sexual Activity    Alcohol use: Yes     Comment: socially     Drug use: Never    Sexual activity: Yes     Partners: Male     Birth control/protection: I.U.D.   Other Topics Concern    Parent/sibling w/ CABG, MI or angioplasty before 65F 55M? No   Social History Narrative    Not on file     Social Determinants of Health     Financial Resource Strain: Low Risk  (2/15/2024)    Financial Resource Strain     Within the past 12 months, have you or your family members you live with been unable to get utilities (heat, electricity) when it was really needed?: No   Food Insecurity: Low Risk  (2/15/2024)    Food Insecurity     Within the past  12 months, did you worry that your food would run out before you got money to buy more?: No     Within the past 12 months, did the food you bought just not last and you didn t have money to get more?: No   Transportation Needs: Low Risk  (2/15/2024)    Transportation Needs     Within the past 12 months, has lack of transportation kept you from medical appointments, getting your medicines, non-medical meetings or appointments, work, or from getting things that you need?: No   Physical Activity: Insufficiently Active (2/15/2024)    Exercise Vital Sign     Days of Exercise per Week: 2 days     Minutes of Exercise per Session: 60 min   Stress: No Stress Concern Present (2/15/2024)    [employer] [place] [employer] - Occupational Stress Questionnaire     Feeling of Stress : Only a little   Social Connections: Unknown (2/15/2024)    Social Connection and Isolation Panel [NHANES]     Frequency of Communication with Friends and Family: Not on file     Frequency of Social Gatherings with Friends and Family: More than three times a week     Attends Orthodoxy Services: Not on file     Active Member of Clubs or Organizations: Not on file     Attends Club or Organization Meetings: Not on file     Marital Status: Not on file   Interpersonal Safety: Low Risk  (2/22/2024)    Interpersonal Safety     Do you feel physically and emotionally safe where you currently live?: Yes     Within the past 12 months, have you been hit, slapped, kicked or otherwise physically hurt by someone?: No     Within the past 12 months, have you been humiliated or emotionally abused in other ways by your partner or ex-partner?: No   Housing Stability: Low Risk  (2/15/2024)    Housing Stability     Do you have housing? : Yes     Are you worried about losing your housing?: No       Drug ingredient [nickel], No clinical screening - see comments, and Propylene glycol  Current Outpatient Medications   Medication Sig Dispense Refill    levonorgestrel  "(MIRENA) 20 MCG/24HR IUD 1 each by Intrauterine route once      spironolactone (ALDACTONE) 100 MG tablet Take 1.5 tablets (150 mg) by mouth daily 135 tablet 3       OBJECTIVE:    Exam:  /86   Pulse 86   Ht 1.76 m (5' 9.29\")   Wt 76.7 kg (169 lb)   LMP 2024 (Exact Date)   BMI 24.75 kg/m    Constitutional: healthy, alert, and no distress  Neck: Neck supple. No adenopathy. Thyroid symmetric, normal size,, Carotids without bruits.  Cardiovascular: negative, PMI normal. No lifts, heaves, or thrills. RRR. No murmurs, clicks gallops or rub  Respiratory: negative, Percussion normal. Good diaphragmatic excursion. Lungs clear  Breast Exam: No visible masses or suspicious skin changes noted., No discrete or dominant masses noted to palpation., and No axillary adenopathy noted..  Gastrointestinal: Abdomen soft, non-tender. BS normal. No masses, organomegaly  Pelvic Exam - External genitalia and vagina normal. Bimanual and rectovaginal normal., negative findings:  external genitalia normal, no abnormal discharge, vaginal mucosa normal, cervix- no lesions, no cervical motion tenderness, IUD strings not seen, adnexae- no masses, non-tender, no pelvic or rectal masses, normal sized uterus  Skin: no suspicious lesions or rashes  Psychiatric: mentation appears normal and affect normal/bright      ASSESSMENT: 29 year old  female here for annual routine pap and IUD checkup. Noting irregular bleeding and pelvic pain for the last couple of months.    PLAN:   - Breast and pelvic exam done today  - Unable to visualize IUD strings; Transvaginal ultrasound ordered. Recommend back up contraception until IUD location confirmed. Discussed alternative forms of contraception if continued issues with Mirena including nexplanon.   - Cervical cancer screening: Pap completed today  - POCT urine pregnancy test is negative today  - GC chlamydia swab sent    Staffed with Jayleen Villar  PGY-3 [facility] Internal " Medicine    Appreciate note by Dr. Morris. Patient has been seen and examined by me with the resident, agree with above note.     Linn Melchor MD

## 2024-05-10 LAB
C TRACH DNA SPEC QL PROBE+SIG AMP: NEGATIVE
N GONORRHOEA DNA SPEC QL NAA+PROBE: NEGATIVE

## 2024-05-14 LAB
BKR LAB AP GYN ADEQUACY: NORMAL
BKR LAB AP GYN INTERPRETATION: NORMAL
BKR LAB AP HPV REFLEX: NORMAL
BKR LAB AP LMP: NORMAL
BKR LAB AP PREVIOUS ABNORMAL: NORMAL
PATH REPORT.COMMENTS IMP SPEC: NORMAL
PATH REPORT.COMMENTS IMP SPEC: NORMAL
PATH REPORT.RELEVANT HX SPEC: NORMAL

## 2024-05-17 ENCOUNTER — ANCILLARY PROCEDURE (OUTPATIENT)
Dept: ULTRASOUND IMAGING | Facility: CLINIC | Age: 29
End: 2024-05-17
Attending: OBSTETRICS & GYNECOLOGY
Payer: COMMERCIAL

## 2024-05-17 DIAGNOSIS — Z30.431 IUD CHECK UP: ICD-10-CM

## 2024-05-17 PROCEDURE — 76830 TRANSVAGINAL US NON-OB: CPT

## 2024-05-17 PROCEDURE — 76830 TRANSVAGINAL US NON-OB: CPT | Mod: 26 | Performed by: OBSTETRICS & GYNECOLOGY

## 2025-05-08 ENCOUNTER — PATIENT OUTREACH (OUTPATIENT)
Dept: CARE COORDINATION | Facility: CLINIC | Age: 30
End: 2025-05-08
Payer: COMMERCIAL

## 2025-06-22 ENCOUNTER — HEALTH MAINTENANCE LETTER (OUTPATIENT)
Age: 30
End: 2025-06-22

## (undated) DEVICE — KIT PROCEDURE FLUENT IN/OUT FLOWPAK TISS TRAP FLT-112S

## (undated) DEVICE — SEAL SET MYOSURE ROD LENS SCOPE SINGLE USE 40-902

## (undated) DEVICE — DECANTER TRANSFER DEVICE 2008S

## (undated) DEVICE — LINEN TOWEL PACK X5 5464

## (undated) DEVICE — SOL WATER IRRIG 1000ML BOTTLE 2F7114

## (undated) DEVICE — SOL NACL 0.9% IRRIG 1000ML BOTTLE 2F7124

## (undated) DEVICE — PAD CHUX UNDERPAD 30X36" P3036C

## (undated) DEVICE — GLOVE PROTEXIS MICRO 6.5  2D73PM65

## (undated) DEVICE — STRAP KNEE/BODY 31143004

## (undated) DEVICE — Device

## (undated) DEVICE — SOL NACL 0.9% IRRIG 3000ML BAG 2B7477

## (undated) DEVICE — LINEN GOWN X4 5410

## (undated) DEVICE — ESU HOLDER LAP INST DISP YELLOW SHORT 250MM H-PRO-250

## (undated) DEVICE — GLOVE PROTEXIS BLUE W/NEU-THERA 6.5  2D73EB65

## (undated) DEVICE — PREP SCRUB SOL EXIDINE 4% CHG 4OZ 29002-404

## (undated) RX ORDER — LIDOCAINE HYDROCHLORIDE 20 MG/ML
INJECTION, SOLUTION EPIDURAL; INFILTRATION; INTRACAUDAL; PERINEURAL
Status: DISPENSED
Start: 2021-12-24

## (undated) RX ORDER — SCOLOPAMINE TRANSDERMAL SYSTEM 1 MG/1
PATCH, EXTENDED RELEASE TRANSDERMAL
Status: DISPENSED
Start: 2021-12-24

## (undated) RX ORDER — DEXAMETHASONE SODIUM PHOSPHATE 4 MG/ML
INJECTION, SOLUTION INTRA-ARTICULAR; INTRALESIONAL; INTRAMUSCULAR; INTRAVENOUS; SOFT TISSUE
Status: DISPENSED
Start: 2021-12-24

## (undated) RX ORDER — ONDANSETRON 2 MG/ML
INJECTION INTRAMUSCULAR; INTRAVENOUS
Status: DISPENSED
Start: 2021-12-24

## (undated) RX ORDER — FENTANYL CITRATE 50 UG/ML
INJECTION, SOLUTION INTRAMUSCULAR; INTRAVENOUS
Status: DISPENSED
Start: 2021-12-24

## (undated) RX ORDER — LIDOCAINE HYDROCHLORIDE 10 MG/ML
INJECTION, SOLUTION EPIDURAL; INFILTRATION; INTRACAUDAL; PERINEURAL
Status: DISPENSED
Start: 2021-12-24

## (undated) RX ORDER — ACETAMINOPHEN 325 MG/1
TABLET ORAL
Status: DISPENSED
Start: 2021-12-24